# Patient Record
Sex: MALE | Race: BLACK OR AFRICAN AMERICAN | NOT HISPANIC OR LATINO
[De-identification: names, ages, dates, MRNs, and addresses within clinical notes are randomized per-mention and may not be internally consistent; named-entity substitution may affect disease eponyms.]

---

## 2017-06-08 ENCOUNTER — NON-APPOINTMENT (OUTPATIENT)
Age: 57
End: 2017-06-08

## 2017-06-08 ENCOUNTER — LABORATORY RESULT (OUTPATIENT)
Age: 57
End: 2017-06-08

## 2017-06-08 ENCOUNTER — APPOINTMENT (OUTPATIENT)
Dept: INTERNAL MEDICINE | Facility: CLINIC | Age: 57
End: 2017-06-08

## 2017-06-08 VITALS
WEIGHT: 250 LBS | HEIGHT: 71 IN | SYSTOLIC BLOOD PRESSURE: 145 MMHG | BODY MASS INDEX: 35 KG/M2 | DIASTOLIC BLOOD PRESSURE: 110 MMHG | HEART RATE: 78 BPM | OXYGEN SATURATION: 98 %

## 2017-06-08 VITALS — SYSTOLIC BLOOD PRESSURE: 140 MMHG | DIASTOLIC BLOOD PRESSURE: 108 MMHG

## 2017-06-08 DIAGNOSIS — Z82.49 FAMILY HISTORY OF ISCHEMIC HEART DISEASE AND OTHER DISEASES OF THE CIRCULATORY SYSTEM: ICD-10-CM

## 2017-06-09 LAB
25(OH)D3 SERPL-MCNC: 19.2 NG/ML
ALBUMIN SERPL ELPH-MCNC: 4.6 G/DL
ALP BLD-CCNC: 92 U/L
ALT SERPL-CCNC: 24 U/L
ANION GAP SERPL CALC-SCNC: 20 MMOL/L
AST SERPL-CCNC: 21 U/L
BASOPHILS # BLD AUTO: 0.04 K/UL
BASOPHILS NFR BLD AUTO: 0.7 %
BILIRUB SERPL-MCNC: 0.4 MG/DL
BUN SERPL-MCNC: 11 MG/DL
C TRACH RRNA SPEC QL NAA+PROBE: NORMAL
CALCIUM SERPL-MCNC: 9.9 MG/DL
CHLORIDE SERPL-SCNC: 101 MMOL/L
CHOLEST SERPL-MCNC: 237 MG/DL
CHOLEST/HDLC SERPL: 4.6 RATIO
CO2 SERPL-SCNC: 20 MMOL/L
CREAT SERPL-MCNC: 1.26 MG/DL
EOSINOPHIL # BLD AUTO: 0.08 K/UL
EOSINOPHIL NFR BLD AUTO: 1.4 %
GLUCOSE SERPL-MCNC: 82 MG/DL
HBA1C MFR BLD HPLC: 6.3 %
HCT VFR BLD CALC: 42.4 %
HDLC SERPL-MCNC: 51 MG/DL
HGB BLD-MCNC: 14.5 G/DL
HIV1+2 AB SPEC QL IA.RAPID: NONREACTIVE
IMM GRANULOCYTES NFR BLD AUTO: 0.2 %
LDLC SERPL CALC-MCNC: 153 MG/DL
LYMPHOCYTES # BLD AUTO: 3 K/UL
LYMPHOCYTES NFR BLD AUTO: 53.4 %
MAN DIFF?: NORMAL
MCHC RBC-ENTMCNC: 30.7 PG
MCHC RBC-ENTMCNC: 34.2 GM/DL
MCV RBC AUTO: 89.6 FL
MONOCYTES # BLD AUTO: 0.66 K/UL
MONOCYTES NFR BLD AUTO: 11.7 %
N GONORRHOEA RRNA SPEC QL NAA+PROBE: NORMAL
NEUTROPHILS # BLD AUTO: 1.83 K/UL
NEUTROPHILS NFR BLD AUTO: 32.6 %
PLATELET # BLD AUTO: 225 K/UL
POTASSIUM SERPL-SCNC: 4.2 MMOL/L
PROT SERPL-MCNC: 7.6 G/DL
PSA SERPL-MCNC: 1.4 NG/ML
RBC # BLD: 4.73 M/UL
RBC # FLD: 13.6 %
SODIUM SERPL-SCNC: 141 MMOL/L
SOURCE AMPLIFICATION: NORMAL
T PALLIDUM AB SER QL IA: POSITIVE
TESTOST SERPL-MCNC: 165.7 NG/DL
TRIGL SERPL-MCNC: 167 MG/DL
TSH SERPL-ACNC: 2.66 UIU/ML
WBC # FLD AUTO: 5.62 K/UL

## 2017-06-15 ENCOUNTER — APPOINTMENT (OUTPATIENT)
Dept: INTERNAL MEDICINE | Facility: CLINIC | Age: 57
End: 2017-06-15

## 2017-06-15 VITALS
OXYGEN SATURATION: 98 % | HEIGHT: 71 IN | BODY MASS INDEX: 34.72 KG/M2 | WEIGHT: 248 LBS | DIASTOLIC BLOOD PRESSURE: 80 MMHG | SYSTOLIC BLOOD PRESSURE: 130 MMHG | HEART RATE: 77 BPM

## 2017-06-15 VITALS — SYSTOLIC BLOOD PRESSURE: 138 MMHG | DIASTOLIC BLOOD PRESSURE: 90 MMHG

## 2017-06-15 RX ADMIN — PENICILLIN G BENZATHINE 2 UNIT/2ML: 1200000 INJECTION, SUSPENSION INTRAMUSCULAR at 00:00

## 2017-06-22 ENCOUNTER — APPOINTMENT (OUTPATIENT)
Dept: INTERNAL MEDICINE | Facility: CLINIC | Age: 57
End: 2017-06-22

## 2017-06-26 ENCOUNTER — APPOINTMENT (OUTPATIENT)
Dept: INTERNAL MEDICINE | Facility: CLINIC | Age: 57
End: 2017-06-26

## 2017-08-29 ENCOUNTER — RX RENEWAL (OUTPATIENT)
Age: 57
End: 2017-08-29

## 2017-08-30 ENCOUNTER — RX RENEWAL (OUTPATIENT)
Age: 57
End: 2017-08-30

## 2017-08-31 ENCOUNTER — RX RENEWAL (OUTPATIENT)
Age: 57
End: 2017-08-31

## 2017-10-02 ENCOUNTER — APPOINTMENT (OUTPATIENT)
Dept: INTERNAL MEDICINE | Facility: CLINIC | Age: 57
End: 2017-10-02
Payer: COMMERCIAL

## 2017-10-02 VITALS — DIASTOLIC BLOOD PRESSURE: 96 MMHG | SYSTOLIC BLOOD PRESSURE: 124 MMHG

## 2017-10-02 PROCEDURE — 99214 OFFICE O/P EST MOD 30 MIN: CPT

## 2017-10-30 ENCOUNTER — RX RENEWAL (OUTPATIENT)
Age: 57
End: 2017-10-30

## 2017-11-13 ENCOUNTER — APPOINTMENT (OUTPATIENT)
Dept: INTERNAL MEDICINE | Facility: CLINIC | Age: 57
End: 2017-11-13

## 2018-04-23 ENCOUNTER — RX RENEWAL (OUTPATIENT)
Age: 58
End: 2018-04-23

## 2018-05-01 ENCOUNTER — RX RENEWAL (OUTPATIENT)
Age: 58
End: 2018-05-01

## 2018-05-08 ENCOUNTER — RX RENEWAL (OUTPATIENT)
Age: 58
End: 2018-05-08

## 2018-05-24 ENCOUNTER — APPOINTMENT (OUTPATIENT)
Dept: INTERNAL MEDICINE | Facility: CLINIC | Age: 58
End: 2018-05-24
Payer: COMMERCIAL

## 2018-05-24 VITALS
OXYGEN SATURATION: 98 % | BODY MASS INDEX: 35.28 KG/M2 | WEIGHT: 252 LBS | HEART RATE: 72 BPM | SYSTOLIC BLOOD PRESSURE: 120 MMHG | DIASTOLIC BLOOD PRESSURE: 80 MMHG | HEIGHT: 71 IN

## 2018-05-24 PROCEDURE — 99214 OFFICE O/P EST MOD 30 MIN: CPT

## 2018-05-24 RX ORDER — AMOXICILLIN 875 MG/1
875 TABLET, FILM COATED ORAL
Qty: 14 | Refills: 0 | Status: DISCONTINUED | COMMUNITY
Start: 2017-12-04

## 2018-05-24 RX ORDER — IBUPROFEN 600 MG/1
600 TABLET, FILM COATED ORAL
Qty: 28 | Refills: 0 | Status: DISCONTINUED | COMMUNITY
Start: 2017-12-04

## 2018-05-24 RX ORDER — SITAGLIPTIN 50 MG/1
50 TABLET, FILM COATED ORAL DAILY
Qty: 18 | Refills: 3 | Status: DISCONTINUED | COMMUNITY
Start: 2017-10-02 | End: 2018-05-24

## 2018-05-24 NOTE — ASSESSMENT
[FreeTextEntry1] : Patient is a 58-year-old male with history of overweight, hypertension, prediabetes, low testosterone/rectal dysfunction, history of positive RPR and presents for followup of multiple issues\par \par #1 erectile dysfunction/low testosterone\par Recheck morning testosterone\par He meanwhile start Viagra 100 mg half tablet as needed\par \par #2 hypertension\par Well-controlled on amlodipine 5 and hydrochlorothiazide 12.5\par \par #3 prediabetes\par DC'd Januvia\par Will check hemoglobin A1c counseled on diet\par \par #4 vitamin D deficiency\par Suggested half tablet gives constipation.\par \par #5 health maintenance\par CPE in 6 weeks\par Colonoscopy referral given

## 2018-05-24 NOTE — PHYSICAL EXAM
[No Acute Distress] : no acute distress [Well Nourished] : well nourished [Well Developed] : well developed [Well-Appearing] : well-appearing [Normal Voice/Communication] : normal voice/communication [No JVD] : no jugular venous distention [Supple] : supple [No Lymphadenopathy] : no lymphadenopathy [Thyroid Normal, No Nodules] : the thyroid was normal and there were no nodules present [No Respiratory Distress] : no respiratory distress  [Clear to Auscultation] : lungs were clear to auscultation bilaterally [No Accessory Muscle Use] : no accessory muscle use [Normal Rate] : normal rate  [Regular Rhythm] : with a regular rhythm [Normal S1, S2] : normal S1 and S2 [No Murmur] : no murmur heard [No Carotid Bruits] : no carotid bruits [No Edema] : there was no peripheral edema [Soft] : abdomen soft [Non Tender] : non-tender [Non-distended] : non-distended [Normal Bowel Sounds] : normal bowel sounds [No CVA Tenderness] : no CVA  tenderness [No Spinal Tenderness] : no spinal tenderness

## 2018-05-24 NOTE — HISTORY OF PRESENT ILLNESS
[FreeTextEntry1] : Followup for multiple issues [de-identified] : Patient is a 58-year-old male with history of obesity, hypertension, low testosterone, history of positive RPR, diabetes and hypercholesterolemia who presents for followup patient complains of persistent erectile dysfunction . Patient taking medication and denies polyuria, polydipsia, chest pain, palpitation, only snores on occasion but still fatigued works a night shift. Patient main complaint of erectile dysfunction, libido intact

## 2018-05-24 NOTE — HEALTH RISK ASSESSMENT
[No falls in past year] : Patient reported no falls in the past year [0] : 2) Feeling down, depressed, or hopeless: Not at all (0) [] : No [de-identified] : social [[id number]] : 0

## 2018-06-05 ENCOUNTER — RX RENEWAL (OUTPATIENT)
Age: 58
End: 2018-06-05

## 2018-07-23 ENCOUNTER — APPOINTMENT (OUTPATIENT)
Dept: INTERNAL MEDICINE | Facility: CLINIC | Age: 58
End: 2018-07-23
Payer: COMMERCIAL

## 2018-07-23 ENCOUNTER — NON-APPOINTMENT (OUTPATIENT)
Age: 58
End: 2018-07-23

## 2018-07-23 VITALS — BODY MASS INDEX: 34.87 KG/M2 | WEIGHT: 250 LBS

## 2018-07-23 VITALS
SYSTOLIC BLOOD PRESSURE: 120 MMHG | OXYGEN SATURATION: 98 % | TEMPERATURE: 98.3 F | DIASTOLIC BLOOD PRESSURE: 84 MMHG | HEIGHT: 71 IN | HEART RATE: 92 BPM

## 2018-07-23 VITALS — DIASTOLIC BLOOD PRESSURE: 86 MMHG | SYSTOLIC BLOOD PRESSURE: 120 MMHG

## 2018-07-23 DIAGNOSIS — R73.03 PREDIABETES.: ICD-10-CM

## 2018-07-23 DIAGNOSIS — E55.9 VITAMIN D DEFICIENCY, UNSPECIFIED: ICD-10-CM

## 2018-07-23 PROCEDURE — 93000 ELECTROCARDIOGRAM COMPLETE: CPT

## 2018-07-23 PROCEDURE — 99396 PREV VISIT EST AGE 40-64: CPT | Mod: 25

## 2018-07-23 PROCEDURE — 36415 COLL VENOUS BLD VENIPUNCTURE: CPT

## 2018-07-23 NOTE — ASSESSMENT
[FreeTextEntry1] : Patient is a 58-year-old male with history of overweight, hypertension, hyperlipidemia, prediabetes, fatigue, erectile dysfunction and vitamin D deficiency who presents for comprehensive annual physical examination.\par \par \par One hypertension\par Continue amlodipine 5 mg and hydrochlorothiazide 25\par Check potassium\par \par #2 hyperlipidemia\par Given strong family history prediabetes borderline number start atorvastatin 10 mg p.o. q. day\par \par #3 overweight\par Counseling diet and exercise\par \par #4 prediabetes\par Counseling diet check hemoglobin A1c\par \par #5 low testosterone\par May be secondary to obesity rule out sleep apnea\par Check for snoring\par \par #6 history of syphilis\par Check RPR\par \par #7 vitamin D insufficiency\par Counseling vitamin D use 400 mg per cystoscopy and stone\par \par #8 constipation\par Start MiraLax\par \par \par Health maintenance\par Colonoscopy pending for July 31\par Check routine labs\par Follow up in 3 months\par Referral to smoke and is\par \par #9 smoking\par Counseled on cessation referral to smoke roger

## 2018-07-23 NOTE — HISTORY OF PRESENT ILLNESS
[FreeTextEntry1] : Comprehensive annual physical examination [de-identified] : \par Patient is a 58-year-old man with history of overweight, hypertension, fatigue, prediabetes, smoking who presents for a comprehensive annual physical examination. Patient does smoke roughly a pack a day denies chest pain, shortness of breath, headache, dizziness, lightheadedness\par

## 2018-07-23 NOTE — HEALTH RISK ASSESSMENT
[Excellent] : ~his/her~  mood as  excellent [No falls in past year] : Patient reported no falls in the past year [0] : 2) Feeling down, depressed, or hopeless: Not at all (0) [None] : None [Alone] : lives alone [Employed] : employed [Single] : single [Significant Other] : lives with significant other [Sexually Active] : sexually active [High Risk Behavior] : high risk behavior [Feels Safe at Home] : Feels safe at home [Fully functional (bathing, dressing, toileting, transferring, walking, feeding)] : Fully functional (bathing, dressing, toileting, transferring, walking, feeding) [Fully functional (using the telephone, shopping, preparing meals, housekeeping, doing laundry, using] : Fully functional and needs no help or supervision to perform IADLs (using the telephone, shopping, preparing meals, housekeeping, doing laundry, using transportation, managing medications and managing finances) [Smoke Detector] : smoke detector [Carbon Monoxide Detector] : carbon monoxide detector [Safety elements used in home] : safety elements used in home [Seat Belt] :  uses seat belt [Sunscreen] : uses sunscreen [] : No [de-identified] : 3/4 ppd [de-identified] : social [Change in mental status noted] : No change in mental status noted [Language] : denies difficulty with language [Behavior] : denies difficulty with behavior [Learning/Retaining New Information] : denies difficulty learning/retaining new information [Handling Complex Tasks] : denies difficulty handling complex tasks [Reasoning] : denies difficulty with reasoning [Spatial Ability and Orientation] : denies difficulty with spatial ability and orientation [Reports changes in hearing] : Reports no changes in hearing [Reports changes in vision] : Reports no changes in vision [Reports changes in dental health] : Reports no changes in dental health [Guns at Home] : no guns at home [Travel to Developing Areas] : does not  travel to developing areas [TB Exposure] : is not being exposed to tuberculosis [Caregiver Concerns] : does not have caregiver concerns [ColonoscopyDate] : pending

## 2018-07-23 NOTE — REVIEW OF SYSTEMS
[Constipation] : constipation [Negative] : Heme/Lymph [Abdominal Pain] : no abdominal pain [Nausea] : no nausea [Diarrhea] : no diarrhea [Vomiting] : no vomiting [Heartburn] : no heartburn [Melena] : no melena

## 2018-07-23 NOTE — PHYSICAL EXAM
[No Acute Distress] : no acute distress [Well Nourished] : well nourished [Well Developed] : well developed [Well-Appearing] : well-appearing [Normal Voice/Communication] : normal voice/communication [Normal Sclera/Conjunctiva] : normal sclera/conjunctiva [Normal Outer Ear/Nose] : the outer ears and nose were normal in appearance [Normal Oropharynx] : the oropharynx was normal [Normal Nasal Mucosa] : the nasal mucosa was normal [No JVD] : no jugular venous distention [Supple] : supple [No Lymphadenopathy] : no lymphadenopathy [Thyroid Normal, No Nodules] : the thyroid was normal and there were no nodules present [No Respiratory Distress] : no respiratory distress  [Clear to Auscultation] : lungs were clear to auscultation bilaterally [Normal Percussion] : the chest was normal to percussion [Normal Rate] : normal rate  [Regular Rhythm] : with a regular rhythm [Normal S1, S2] : normal S1 and S2 [No Murmur] : no murmur heard [No Carotid Bruits] : no carotid bruits [No Abdominal Bruit] : a ~M bruit was not heard ~T in the abdomen [No Varicosities] : no varicosities [Pedal Pulses Present] : the pedal pulses are present [No Edema] : there was no peripheral edema [No Extremity Clubbing/Cyanosis] : no extremity clubbing/cyanosis [No Palpable Aorta] : no palpable aorta [Soft] : abdomen soft [Non Tender] : non-tender [Non-distended] : non-distended [No Masses] : no abdominal mass palpated [No HSM] : no HSM [Normal Bowel Sounds] : normal bowel sounds [No Hernias] : no hernias [No Stool to Guaiac] : no stool to guaiac [Normal Sphincter Tone] : normal sphincter tone [No Mass] : no mass [Penis Abnormality] : normal uncircumcised penis [Scrotum] : the scrotum was normal [Testes Mass (___cm)] : there were no testicular masses [Prostate Enlargement] : the prostate was not enlarged [Prostate Tenderness] : the prostate was not tender [No Prostate Nodules] : no prostate nodules [No CVA Tenderness] : no CVA  tenderness [No Spinal Tenderness] : no spinal tenderness [No Joint Swelling] : no joint swelling [Grossly Normal Strength/Tone] : grossly normal strength/tone [No Rash] : no rash [No Skin Lesions] : no skin lesions [Normal Gait] : normal gait [Coordination Grossly Intact] : coordination grossly intact [No Focal Deficits] : no focal deficits [Deep Tendon Reflexes (DTR)] : deep tendon reflexes were 2+ and symmetric [Speech Grossly Normal] : speech grossly normal [Memory Grossly Normal] : memory grossly normal [Normal Affect] : the affect was normal [Alert and Oriented x3] : oriented to person, place, and time [Normal Mood] : the mood was normal [Normal Insight/Judgement] : insight and judgment were intact [Stool Occult Blood] : no occult stool [de-identified] : bilateral cerumen

## 2018-07-24 ENCOUNTER — RX RENEWAL (OUTPATIENT)
Age: 58
End: 2018-07-24

## 2018-07-31 ENCOUNTER — APPOINTMENT (OUTPATIENT)
Dept: GASTROENTEROLOGY | Facility: CLINIC | Age: 58
End: 2018-07-31
Payer: COMMERCIAL

## 2018-07-31 VITALS
OXYGEN SATURATION: 98 % | DIASTOLIC BLOOD PRESSURE: 62 MMHG | BODY MASS INDEX: 35 KG/M2 | HEIGHT: 71 IN | TEMPERATURE: 98.3 F | HEART RATE: 86 BPM | WEIGHT: 250 LBS | SYSTOLIC BLOOD PRESSURE: 138 MMHG | RESPIRATION RATE: 16 BRPM

## 2018-07-31 PROCEDURE — 99204 OFFICE O/P NEW MOD 45 MIN: CPT

## 2018-08-08 ENCOUNTER — RX RENEWAL (OUTPATIENT)
Age: 58
End: 2018-08-08

## 2018-08-08 ENCOUNTER — EMERGENCY (EMERGENCY)
Facility: HOSPITAL | Age: 58
LOS: 1 days | Discharge: HOME | End: 2018-08-08
Attending: EMERGENCY MEDICINE
Payer: SELF-PAY

## 2018-08-08 ENCOUNTER — APPOINTMENT (OUTPATIENT)
Dept: INTERNAL MEDICINE | Facility: CLINIC | Age: 58
End: 2018-08-08
Payer: COMMERCIAL

## 2018-08-08 VITALS
HEIGHT: 71 IN | BODY MASS INDEX: 34.86 KG/M2 | HEART RATE: 103 BPM | TEMPERATURE: 98.3 F | DIASTOLIC BLOOD PRESSURE: 80 MMHG | WEIGHT: 249 LBS | OXYGEN SATURATION: 98 % | SYSTOLIC BLOOD PRESSURE: 112 MMHG

## 2018-08-08 VITALS
TEMPERATURE: 98 F | HEART RATE: 99 BPM | OXYGEN SATURATION: 97 % | SYSTOLIC BLOOD PRESSURE: 117 MMHG | RESPIRATION RATE: 18 BRPM | DIASTOLIC BLOOD PRESSURE: 77 MMHG

## 2018-08-08 PROCEDURE — 99283 EMERGENCY DEPT VISIT LOW MDM: CPT

## 2018-08-08 PROCEDURE — 99213 OFFICE O/P EST LOW 20 MIN: CPT

## 2018-08-08 PROCEDURE — 99283 EMERGENCY DEPT VISIT LOW MDM: CPT | Mod: 25

## 2018-08-08 PROCEDURE — 71046 X-RAY EXAM CHEST 2 VIEWS: CPT | Mod: 26

## 2018-08-08 PROCEDURE — 71046 X-RAY EXAM CHEST 2 VIEWS: CPT

## 2018-08-08 RX ORDER — ACETAMINOPHEN 500 MG
650 TABLET ORAL ONCE
Qty: 0 | Refills: 0 | Status: COMPLETED | OUTPATIENT
Start: 2018-08-08 | End: 2018-08-08

## 2018-08-08 RX ADMIN — Medication 650 MILLIGRAM(S): at 17:58

## 2018-08-08 RX ADMIN — Medication 650 MILLIGRAM(S): at 19:07

## 2018-08-08 NOTE — PHYSICAL EXAM
[No Respiratory Distress] : no respiratory distress  [Clear to Auscultation] : lungs were clear to auscultation bilaterally [No Accessory Muscle Use] : no accessory muscle use [Normal Percussion] : the chest was normal to percussion [Normal Rate] : normal rate  [Regular Rhythm] : with a regular rhythm [Normal S1, S2] : normal S1 and S2 [de-identified] : L knee pain      L Neer ++  + Empty Can

## 2018-08-08 NOTE — HISTORY OF PRESENT ILLNESS
[FreeTextEntry8] : 57 yo \par \par Jumped into a truck\par SH \par \par Tried to step up to the truck  step w r foot lower step, then L foot 2nd step, R foot slipped off lower step, fall down hitting 3rd step w ribs\par knocked wind out of him. Pain when cough\par \par L shoulder L knee and L rib pain

## 2018-08-08 NOTE — ED ADULT TRIAGE NOTE - CHIEF COMPLAINT QUOTE
L rib pain; pt missed the step to get into his truck hitting his L side on the landing of the truck ([employer] truck); also c/o L knee pain; pt ambulatory without difficulty in triage

## 2018-08-08 NOTE — ED PROVIDER NOTE - PHYSICAL EXAMINATION
Gen: AAOx3, non-toxic  Head: NCAT  HEENT: EOMI, oral mucosa moist, normal conjunctiva  Lung: CTAB, no respiratory distress, no wheezes/rhonchi/rales B/L, speaking in full sentences  CV: RRR, no murmurs, rubs or gallops  Abd: soft, NTND, no guarding  MSK: +tenderness to palpation over the left anterior rib slightly above the level of the xiphoid & mid-clavicular.  no visible deformities. no bony tenderness over shoulders or knees. full ROM of shoulders and knees.   Neuro: No focal sensory or motor deficits  Skin: Warm, well perfused, no rash, no laceration or ecchymoses.   Psych: normal affect.   ~Augie Perez PGY1

## 2018-08-08 NOTE — ED ADULT NURSE NOTE - NSIMPLEMENTINTERV_GEN_ALL_ED
Implemented All Universal Safety Interventions:  [place] to call system. Call bell, personal items and telephone within reach. Instruct patient to call for assistance. Room bathroom lighting operational. Non-slip footwear when patient is off stretcher. Physically safe environment: no spills, clutter or unnecessary equipment. Stretcher in lowest position, wheels locked, appropriate side rails in place.

## 2018-08-08 NOTE — ED ADULT NURSE NOTE - OBJECTIVE STATEMENT
59 y/o M, PMH HTN on meds, presents ambulatory to ED c/o L rib pain and L knee pain s/p missing the step to get into his truck at 0830, pt hit his L side on the landing of the [employer] truck. Pt did not fall onto ground, did hit head, no LOC, no blood thinner use. Pt reports 0/10 L rib pain at rest and 5/10 with getting up, deep inspiration. Pt also c/o L knee pain and L shoulder pain, worse with ROM. Pt is ambulatory without difficulty. No numbness/tingling, sensory deficits. No redness/swelling/ecchymosis/abrasion/laceration/bleeding noted. Pt took an over the counter pain med at 1000 but isn't sure what it was. Safety maintained.

## 2018-08-08 NOTE — ED PROVIDER NOTE - OBJECTIVE STATEMENT
57 y/o patient with PMHx of HTN presenting to the ED after a fall this morning around 8:30. No head trauma, no LOC, not on blood thinners. Has dull non-radiating pain over the left side of his rib cage made worse on coughing and taking a deep breath. Also has left knee pain and left shoulder pain.   Denies any other associated symptoms including headache, changes in vision, nausea, vomiting, SOB, numbness, tingling, weakness, or paralysis. 57 y/o patient with PMHx of HTN presenting to the ED after a fall this morning around 8:30 AM. The patient was  No head trauma, no LOC, not on blood thinners. Has dull non-radiating pain over the left side of his rib cage made worse on coughing and taking a deep breath. Also has left knee pain and left shoulder pain.   Denies any other associated symptoms including headache, changes in vision, nausea, vomiting, SOB, numbness, tingling, weakness, or paralysis. 57 y/o patient with PMHx of HTN presenting to the ED after a fall this morning around 8:30 AM. The patient was climbing into the cab of his [employer] when he slipped and felling into the cab, hitting the left side of his rib cage on the chair. He then fell backwards onto his left knee and left shoulder. No head trauma, no LOC, not on blood thinners. He continued to work for the rest of the day but decided to come to the ED when he continued to experience a dull non-radiating pain over the left side of his rib cage. The pain is made worse on coughing and taking a deep breath. Also reports minor left knee pain and left shoulder pain.    Denies any other associated symptoms including headache, changes in vision, nausea, vomiting, SOB, numbness, tingling, weakness, or paralysis. 57 y/o patient with PMHx of HTN presenting to the ED after a fall this morning around 8:30 AM. The patient was climbing into his [employer] when he slipped and fell into the cab, hitting the left side of his rib cage on the chair. He then fell backwards onto his left knee and left shoulder. No head trauma, no LOC, not on blood thinners. He continued to work for the rest of the day but decided to come to the ED when he continued to experience a dull non-radiating pain over the left side of his rib cage. The pain is made worse on coughing and taking a deep breath. Also reports minor left knee pain and left shoulder pain.    Denies any other associated symptoms including headache, changes in vision, nausea, vomiting, SOB, numbness, tingling, weakness, or paralysis.

## 2018-08-08 NOTE — ED PROVIDER NOTE - MEDICAL DECISION MAKING DETAILS
59 y/o patient with PMHx of HTN presenting to the ED with left anterior rib pain after a fall. Localized tenderness + increased pain on coughing/deep breath raises suspicion for rib fracture. Due to mechanism and pain pattern must also r/o pneumothorax. Shoulder and knee pain minor and highly unlikely to be associated with fracture--no bony tenderness and full ROM of both joints without significant pain. Will obtain CXR and reassess. 59 y/o patient with PMHx of HTN presenting to the ED with left anterior rib pain after a fall. Localized tenderness + increased pain on coughing/deep breath raises suspicion for insolated rib fracture. Due to mechanism and pain pattern must also r/o pneumothorax. Shoulder and knee pain minor and highly unlikely to be associated with fracture--no bony tenderness and full ROM of both joints without significant pain. Will obtain CXR and reassess.  --BMM

## 2018-08-08 NOTE — ED ADULT NURSE NOTE - CHPI ED NUR SYMPTOMS NEG
no fever/no bleeding/no vomiting/no numbness/no tingling/no deformity/no abrasion/no weakness/no confusion/no loss of consciousness

## 2018-08-10 LAB
25(OH)D3 SERPL-MCNC: 22.6 NG/ML
ALBUMIN SERPL ELPH-MCNC: 4.5 G/DL
ALP BLD-CCNC: 92 U/L
ALT SERPL-CCNC: 23 U/L
ANION GAP SERPL CALC-SCNC: 13 MMOL/L
AST SERPL-CCNC: 21 U/L
BASOPHILS # BLD AUTO: 0.02 K/UL
BASOPHILS NFR BLD AUTO: 0.3 %
BILIRUB SERPL-MCNC: 0.3 MG/DL
BUN SERPL-MCNC: 15 MG/DL
CALCIUM SERPL-MCNC: 9.7 MG/DL
CHLORIDE SERPL-SCNC: 102 MMOL/L
CHOLEST SERPL-MCNC: 238 MG/DL
CHOLEST/HDLC SERPL: 5.1 RATIO
CO2 SERPL-SCNC: 26 MMOL/L
CREAT SERPL-MCNC: 1.12 MG/DL
EOSINOPHIL # BLD AUTO: 0.11 K/UL
EOSINOPHIL NFR BLD AUTO: 1.5 %
GLUCOSE SERPL-MCNC: 96 MG/DL
HCT VFR BLD CALC: 41.8 %
HDLC SERPL-MCNC: 47 MG/DL
HGB BLD-MCNC: 14.1 G/DL
IMM GRANULOCYTES NFR BLD AUTO: 0.3 %
LDLC SERPL CALC-MCNC: 142 MG/DL
LYMPHOCYTES # BLD AUTO: 2.92 K/UL
LYMPHOCYTES NFR BLD AUTO: 38.9 %
MAN DIFF?: NORMAL
MCHC RBC-ENTMCNC: 30.3 PG
MCHC RBC-ENTMCNC: 33.7 GM/DL
MCV RBC AUTO: 89.7 FL
MONOCYTES # BLD AUTO: 0.83 K/UL
MONOCYTES NFR BLD AUTO: 11.1 %
NEUTROPHILS # BLD AUTO: 3.61 K/UL
NEUTROPHILS NFR BLD AUTO: 47.9 %
PLATELET # BLD AUTO: 246 K/UL
POTASSIUM SERPL-SCNC: 4 MMOL/L
PROT SERPL-MCNC: 7.6 G/DL
PSA SERPL-MCNC: 1.24 NG/ML
RBC # BLD: 4.66 M/UL
RBC # FLD: 13.7 %
RPR SER-TITR: ABNORMAL
SODIUM SERPL-SCNC: 141 MMOL/L
TESTOST SERPL-MCNC: 193.3 NG/DL
TRIGL SERPL-MCNC: 243 MG/DL
WBC # FLD AUTO: 7.51 K/UL

## 2018-09-04 ENCOUNTER — RX RENEWAL (OUTPATIENT)
Age: 58
End: 2018-09-04

## 2018-10-03 ENCOUNTER — APPOINTMENT (OUTPATIENT)
Dept: GASTROENTEROLOGY | Facility: AMBULATORY MEDICAL SERVICES | Age: 58
End: 2018-10-03
Payer: COMMERCIAL

## 2018-10-03 PROCEDURE — 45385 COLONOSCOPY W/LESION REMOVAL: CPT

## 2018-10-29 ENCOUNTER — APPOINTMENT (OUTPATIENT)
Dept: INTERNAL MEDICINE | Facility: CLINIC | Age: 58
End: 2018-10-29

## 2019-01-14 ENCOUNTER — RX RENEWAL (OUTPATIENT)
Age: 59
End: 2019-01-14

## 2019-02-11 ENCOUNTER — RX RENEWAL (OUTPATIENT)
Age: 59
End: 2019-02-11

## 2019-02-28 ENCOUNTER — RX RENEWAL (OUTPATIENT)
Age: 59
End: 2019-02-28

## 2019-05-03 ENCOUNTER — RX RENEWAL (OUTPATIENT)
Age: 59
End: 2019-05-03

## 2019-08-08 PROBLEM — I10 ESSENTIAL (PRIMARY) HYPERTENSION: Chronic | Status: ACTIVE | Noted: 2018-08-08

## 2019-08-13 ENCOUNTER — APPOINTMENT (OUTPATIENT)
Dept: INTERNAL MEDICINE | Facility: CLINIC | Age: 59
End: 2019-08-13
Payer: COMMERCIAL

## 2019-08-13 VITALS
DIASTOLIC BLOOD PRESSURE: 80 MMHG | OXYGEN SATURATION: 95 % | SYSTOLIC BLOOD PRESSURE: 141 MMHG | WEIGHT: 264 LBS | BODY MASS INDEX: 36.96 KG/M2 | TEMPERATURE: 98.6 F | HEART RATE: 78 BPM | HEIGHT: 71 IN

## 2019-08-13 DIAGNOSIS — R79.89 OTHER SPECIFIED ABNORMAL FINDINGS OF BLOOD CHEMISTRY: ICD-10-CM

## 2019-08-13 DIAGNOSIS — F17.200 NICOTINE DEPENDENCE, UNSPECIFIED, UNCOMPLICATED: ICD-10-CM

## 2019-08-13 DIAGNOSIS — E66.3 OVERWEIGHT: ICD-10-CM

## 2019-08-13 DIAGNOSIS — T14.90XA INJURY, UNSPECIFIED, INITIAL ENCOUNTER: ICD-10-CM

## 2019-08-13 DIAGNOSIS — I49.1 ATRIAL PREMATURE DEPOLARIZATION: ICD-10-CM

## 2019-08-13 PROCEDURE — G0442 ANNUAL ALCOHOL SCREEN 15 MIN: CPT

## 2019-08-13 PROCEDURE — G0444 DEPRESSION SCREEN ANNUAL: CPT

## 2019-08-13 PROCEDURE — 99214 OFFICE O/P EST MOD 30 MIN: CPT

## 2019-08-13 PROCEDURE — 99407 BEHAV CHNG SMOKING > 10 MIN: CPT | Mod: 25

## 2019-08-13 PROCEDURE — G0447 BEHAVIOR COUNSEL OBESITY 15M: CPT

## 2019-08-13 RX ORDER — POLYETHYLENE GLYCOL 3350, SODIUM SULFATE ANHYDROUS, SODIUM BICARBONATE, SODIUM CHLORIDE, POTASSIUM CHLORIDE 227.1; 21.5; 6.36; 5.53; .754 G/L; G/L; G/L; G/L; G/L
227.1 POWDER, FOR SOLUTION ORAL
Qty: 1 | Refills: 0 | Status: DISCONTINUED | COMMUNITY
Start: 2018-07-31 | End: 2019-08-13

## 2019-08-13 RX ORDER — SILDENAFIL 100 MG/1
100 TABLET, FILM COATED ORAL
Qty: 10 | Refills: 6 | Status: DISCONTINUED | COMMUNITY
Start: 2018-05-24 | End: 2019-08-13

## 2019-08-13 RX ORDER — MELOXICAM 15 MG/1
15 TABLET ORAL
Qty: 30 | Refills: 3 | Status: DISCONTINUED | COMMUNITY
Start: 2018-08-08 | End: 2019-08-13

## 2019-08-14 PROBLEM — I49.1 PAC (PREMATURE ATRIAL CONTRACTION): Status: RESOLVED | Noted: 2017-06-08 | Resolved: 2019-08-14

## 2019-08-14 PROBLEM — R79.89 LOW TESTOSTERONE IN MALE: Status: RESOLVED | Noted: 2018-07-23 | Resolved: 2019-08-14

## 2019-08-14 PROBLEM — T14.90XA TRAUMA, BLUNT: Status: RESOLVED | Noted: 2018-08-08 | Resolved: 2019-08-14

## 2019-08-14 PROBLEM — E66.3 OVERWEIGHT: Noted: 2017-06-08

## 2019-08-14 LAB
ALBUMIN SERPL ELPH-MCNC: 4.6 G/DL
ALP BLD-CCNC: 91 U/L
ALT SERPL-CCNC: 25 U/L
ANION GAP SERPL CALC-SCNC: 15 MMOL/L
AST SERPL-CCNC: 20 U/L
BASOPHILS # BLD AUTO: 0.05 K/UL
BASOPHILS NFR BLD AUTO: 0.8 %
BILIRUB SERPL-MCNC: 0.2 MG/DL
BUN SERPL-MCNC: 11 MG/DL
CALCIUM SERPL-MCNC: 9.9 MG/DL
CHLORIDE SERPL-SCNC: 107 MMOL/L
CHOLEST SERPL-MCNC: 212 MG/DL
CHOLEST/HDLC SERPL: 5.4 RATIO
CO2 SERPL-SCNC: 21 MMOL/L
CREAT SERPL-MCNC: 1.21 MG/DL
EOSINOPHIL # BLD AUTO: 0.12 K/UL
EOSINOPHIL NFR BLD AUTO: 2 %
ESTIMATED AVERAGE GLUCOSE: 128 MG/DL
GLUCOSE SERPL-MCNC: 90 MG/DL
HBA1C MFR BLD HPLC: 6.1 %
HCT VFR BLD CALC: 43.4 %
HDLC SERPL-MCNC: 39 MG/DL
HGB BLD-MCNC: 14.2 G/DL
HIV1+2 AB SPEC QL IA.RAPID: NONREACTIVE
IMM GRANULOCYTES NFR BLD AUTO: 0.3 %
LDLC SERPL CALC-MCNC: 120 MG/DL
LYMPHOCYTES # BLD AUTO: 3.25 K/UL
LYMPHOCYTES NFR BLD AUTO: 53.2 %
MAN DIFF?: NORMAL
MCHC RBC-ENTMCNC: 29.9 PG
MCHC RBC-ENTMCNC: 32.7 GM/DL
MCV RBC AUTO: 91.4 FL
MONOCYTES # BLD AUTO: 0.78 K/UL
MONOCYTES NFR BLD AUTO: 12.8 %
NEUTROPHILS # BLD AUTO: 1.89 K/UL
NEUTROPHILS NFR BLD AUTO: 30.9 %
PLATELET # BLD AUTO: 202 K/UL
POTASSIUM SERPL-SCNC: 4.4 MMOL/L
PROT SERPL-MCNC: 7.2 G/DL
PSA SERPL-MCNC: 1.23 NG/ML
RBC # BLD: 4.75 M/UL
RBC # FLD: 13.5 %
SODIUM SERPL-SCNC: 143 MMOL/L
TRIGL SERPL-MCNC: 263 MG/DL
TSH SERPL-ACNC: 3.26 UIU/ML
WBC # FLD AUTO: 6.11 K/UL

## 2019-08-14 RX ORDER — HYDROCHLOROTHIAZIDE 25 MG/1
25 TABLET ORAL
Qty: 90 | Refills: 3 | Status: DISCONTINUED | COMMUNITY
Start: 2017-06-08 | End: 2019-08-14

## 2019-08-14 RX ORDER — AMLODIPINE BESYLATE 5 MG/1
5 TABLET ORAL DAILY
Qty: 90 | Refills: 2 | Status: DISCONTINUED | COMMUNITY
Start: 2017-06-08 | End: 2019-08-14

## 2019-08-14 NOTE — PHYSICAL EXAM
[No Acute Distress] : no acute distress [Well Nourished] : well nourished [Well Developed] : well developed [Well-Appearing] : well-appearing [Normal Sclera/Conjunctiva] : normal sclera/conjunctiva [PERRL] : pupils equal round and reactive to light [EOMI] : extraocular movements intact [Normal Outer Ear/Nose] : the outer ears and nose were normal in appearance [Normal Oropharynx] : the oropharynx was normal [No JVD] : no jugular venous distention [No Lymphadenopathy] : no lymphadenopathy [Supple] : supple [No Respiratory Distress] : no respiratory distress  [Thyroid Normal, No Nodules] : the thyroid was normal and there were no nodules present [No Accessory Muscle Use] : no accessory muscle use [Clear to Auscultation] : lungs were clear to auscultation bilaterally [Normal Rate] : normal rate  [Regular Rhythm] : with a regular rhythm [No Murmur] : no murmur heard [Normal S1, S2] : normal S1 and S2 [No Carotid Bruits] : no carotid bruits [No Abdominal Bruit] : a ~M bruit was not heard ~T in the abdomen [No Varicosities] : no varicosities [Pedal Pulses Present] : the pedal pulses are present [No Edema] : there was no peripheral edema [No Palpable Aorta] : no palpable aorta [No Extremity Clubbing/Cyanosis] : no extremity clubbing/cyanosis [Soft] : abdomen soft [Non-distended] : non-distended [Non Tender] : non-tender [No Masses] : no abdominal mass palpated [No HSM] : no HSM [Normal Bowel Sounds] : normal bowel sounds [Normal Posterior Cervical Nodes] : no posterior cervical lymphadenopathy [Normal Anterior Cervical Nodes] : no anterior cervical lymphadenopathy [No CVA Tenderness] : no CVA  tenderness [No Spinal Tenderness] : no spinal tenderness [No Joint Swelling] : no joint swelling [Grossly Normal Strength/Tone] : grossly normal strength/tone [No Rash] : no rash [Coordination Grossly Intact] : coordination grossly intact [No Focal Deficits] : no focal deficits [Normal Gait] : normal gait [Deep Tendon Reflexes (DTR)] : deep tendon reflexes were 2+ and symmetric [Normal Affect] : the affect was normal [Normal Insight/Judgement] : insight and judgment were intact

## 2019-08-14 NOTE — ASSESSMENT
[FreeTextEntry1] : \par Medical Issue 1: Chronic hypertension: unstable and variably controlled with BP readings fluctuating from 120s-140s/70s-90s; check renal panel and electrolytes via blood draw today; pt says he no longer wants to take his medication and wants to try to control his diet and start exercising again before restarting the medications\par \par Medical Issue 2: Hyperlipidemia: unstable and variably controlled; he ran out of his statin medication 1 month ago; check lipid panel today via blood draw and encouraged diet, exercise and weight loss\par \par Medical Issue 3: Obesity counselling: 15 mins, assessed BMI at 30 and above now in obese range; advised weight loss, exercise routine and diet; pt agreed to start exercise program for target weight loss 20 lbs; assisted patient with resources including nutrition referral as needed and arranged for follow-up to monitor weight loss progress over next several months\par \par Tobacco counselling: spent >10 mins with patient assessed current tobacco usage of 1/2 PPD which increased from 1/4 PPD; advised decrease tobacco intake and quit with nicotine replacement therapy; pt agreed to start Nicorette gum or Nicoderm patches; assisted patient with resources including tobacco cessation programs as needed and arranged for follow-up to monitor progress\par \par Depression screen performed today, PHQ2=0\par \par Annual alcohol misuse screen, 15 mins, done; negative

## 2019-08-14 NOTE — HEALTH RISK ASSESSMENT
[] : Yes [11-15] : 11-15 [Yes] : Yes [Monthly or less (1 pt)] : Monthly or less (1 point) [1 or 2 (0 pts)] : 1 or 2 (0 points) [No falls in past year] : Patient reported no falls in the past year [Audit-CScore] : 0 [de-identified] : poor

## 2021-08-01 ENCOUNTER — EMERGENCY (EMERGENCY)
Facility: HOSPITAL | Age: 61
LOS: 1 days | Discharge: HOME | End: 2021-08-01
Admitting: STUDENT IN AN ORGANIZED HEALTH CARE EDUCATION/TRAINING PROGRAM
Payer: COMMERCIAL

## 2021-08-01 VITALS
HEART RATE: 94 BPM | OXYGEN SATURATION: 100 % | TEMPERATURE: 98 F | RESPIRATION RATE: 18 BRPM | DIASTOLIC BLOOD PRESSURE: 96 MMHG | SYSTOLIC BLOOD PRESSURE: 153 MMHG

## 2021-08-01 LAB
B PERT DNA SPEC QL NAA+PROBE: SIGNIFICANT CHANGE UP
C PNEUM DNA SPEC QL NAA+PROBE: SIGNIFICANT CHANGE UP
FLUAV H1 2009 PAND RNA SPEC QL NAA+PROBE: SIGNIFICANT CHANGE UP
FLUAV H1 RNA SPEC QL NAA+PROBE: SIGNIFICANT CHANGE UP
FLUAV H3 RNA SPEC QL NAA+PROBE: SIGNIFICANT CHANGE UP
FLUAV SUBTYP SPEC NAA+PROBE: SIGNIFICANT CHANGE UP
FLUBV RNA SPEC QL NAA+PROBE: SIGNIFICANT CHANGE UP
HADV DNA SPEC QL NAA+PROBE: SIGNIFICANT CHANGE UP
HCOV 229E RNA SPEC QL NAA+PROBE: SIGNIFICANT CHANGE UP
HCOV HKU1 RNA SPEC QL NAA+PROBE: SIGNIFICANT CHANGE UP
HCOV NL63 RNA SPEC QL NAA+PROBE: SIGNIFICANT CHANGE UP
HCOV OC43 RNA SPEC QL NAA+PROBE: SIGNIFICANT CHANGE UP
HMPV RNA SPEC QL NAA+PROBE: SIGNIFICANT CHANGE UP
HPIV1 RNA SPEC QL NAA+PROBE: SIGNIFICANT CHANGE UP
HPIV2 RNA SPEC QL NAA+PROBE: SIGNIFICANT CHANGE UP
HPIV3 RNA SPEC QL NAA+PROBE: SIGNIFICANT CHANGE UP
HPIV4 RNA SPEC QL NAA+PROBE: SIGNIFICANT CHANGE UP
RAPID RVP RESULT: DETECTED
RSV RNA SPEC QL NAA+PROBE: SIGNIFICANT CHANGE UP
RV+EV RNA SPEC QL NAA+PROBE: SIGNIFICANT CHANGE UP
SARS-COV-2 RNA SPEC QL NAA+PROBE: DETECTED

## 2021-08-01 PROCEDURE — 99284 EMERGENCY DEPT VISIT MOD MDM: CPT

## 2021-08-01 NOTE — ED PROVIDER NOTE - NS ED ROS FT
Constitutional: (+) Fatigue, (-) Fever, (-) Chills, (-) Anorexia  Skin: (-) Rashes  Ears: (-) Ear pain  Nose: (-) Nasal congestion, (-) Runny nose  Mouth/Throat: (-) Sore throat  CV: (-) Chest pain, (-) Palpitations  Resp: (-) Cough, (-) Shortness of breath, (-) Dyspnea on Exertion, (-) Wheezing  GI: (-) Abdominal pain, (-) Nausea, (-) Vomiting, (-) Diarrhea  : (-) Dysuria, (-) Hematuria, (-) Increased frequency  MSK: (-) Weakness, (-) Myalgias, (-) Calf pain  Neuro: (-) Headache

## 2021-08-01 NOTE — ED ADULT NURSE NOTE - OBJECTIVE STATEMENT
pt presents with c/o loss of smell and taste, states returned from [facility] last night, and sx developed while still in [place], states not aware of any exposure to covid positive people, no other symptoms reported, breathing easy

## 2021-08-01 NOTE — ED PROVIDER NOTE - CLINICAL SUMMARY MEDICAL DECISION MAKING FREE TEXT BOX
61y Male with no sig PHMx presents to the ER for lost sense of taste and smell x 3 days. Denies runny nose, cough, chest pain, shortness of breath, abdominal pain, nausea, vomiting or diarrhea. Vital signs stable, concern for viral illness, likely covid - will swab and dc.

## 2021-08-01 NOTE — ED PROVIDER NOTE - OBJECTIVE STATEMENT
61y Male with no sig PHMx presents to the ER for lost sense of taste and smell. Patient reports feeling fatigue, body aches, subjective fever last week followed by lost sense of taste and smell 3 days ago after going to [place]. Patient states he got home from [place] last night, states his girlfriend woke up without her sense of taste and smell so presents to the ER for evaluation. Denies runny nose, cough, chest pain, shortness of breath, abdominal pain, nausea, vomiting or diarrhea.

## 2021-08-01 NOTE — ED PROVIDER NOTE - NSFOLLOWUPINSTRUCTIONS_ED_ALL_ED_FT
Take Tylenol 650mg (Two 325 mg pills) every 4-6 hours as needed for pain or fevers.    Take Motrin 600 mg every 8 hours as needed for moderate pain or fevers -- take with food.    Advance activity as tolerated.     Continue all previously prescribed medications as directed unless otherwise instructed.     Follow up with your primary care physician in 48-72 hours- bring copies of your results.     Return to the ER for worsening or persistent symptoms, and/or ANY NEW OR CONCERNING SYMPTOMS including but not limited to chest pain, shortness of breath or difficulty breathing.    If you have issues obtaining follow up, please call: [phone] to obtain a doctor or specialist who takes your insurance in your area.  You may call [phone] to make an appointment with the internal medicine clinic.

## 2021-08-01 NOTE — ED PROVIDER NOTE - PATIENT PORTAL LINK FT
You can access the FollowMyHealth Patient Portal offered by [facility] by registering at the following website: [email or web address]. By joining [facility]’s FollowMyHealth portal, you will also be able to view your health information using other applications (apps) compatible with our system.

## 2021-08-03 NOTE — ED POST DISCHARGE NOTE - REASON FOR FOLLOW-UP
COVID-19 : detected. Patient contacted already aware of positive results received email notification. Emailed to patient positive results for his work. Emailed to [email or web address] Other

## 2021-10-27 ENCOUNTER — EMERGENCY (EMERGENCY)
Facility: HOSPITAL | Age: 61
LOS: 1 days | Discharge: HOME | End: 2021-10-27
Admitting: EMERGENCY MEDICINE
Payer: COMMERCIAL

## 2021-10-27 VITALS
SYSTOLIC BLOOD PRESSURE: 156 MMHG | DIASTOLIC BLOOD PRESSURE: 91 MMHG | TEMPERATURE: 99 F | OXYGEN SATURATION: 98 % | HEART RATE: 77 BPM | RESPIRATION RATE: 16 BRPM

## 2021-10-27 VITALS
HEART RATE: 82 BPM | SYSTOLIC BLOOD PRESSURE: 144 MMHG | DIASTOLIC BLOOD PRESSURE: 74 MMHG | TEMPERATURE: 98 F | RESPIRATION RATE: 16 BRPM | OXYGEN SATURATION: 99 %

## 2021-10-27 PROCEDURE — 73030 X-RAY EXAM OF SHOULDER: CPT | Mod: 26,LT

## 2021-10-27 PROCEDURE — 99053 MED SERV 10PM-8AM 24 HR FAC: CPT

## 2021-10-27 PROCEDURE — 99284 EMERGENCY DEPT VISIT MOD MDM: CPT

## 2021-10-27 PROCEDURE — 72100 X-RAY EXAM L-S SPINE 2/3 VWS: CPT | Mod: 26

## 2021-10-27 RX ORDER — IBUPROFEN 200 MG
1 TABLET ORAL
Qty: 25 | Refills: 0
Start: 2021-10-27

## 2021-10-27 RX ORDER — IBUPROFEN 200 MG
600 TABLET ORAL ONCE
Refills: 0 | Status: COMPLETED | OUTPATIENT
Start: 2021-10-27 | End: 2021-10-27

## 2021-10-27 RX ORDER — CYCLOBENZAPRINE HYDROCHLORIDE 10 MG/1
1 TABLET, FILM COATED ORAL
Qty: 20 | Refills: 0
Start: 2021-10-27

## 2021-10-27 RX ORDER — LIDOCAINE 4 G/100G
1 CREAM TOPICAL ONCE
Refills: 0 | Status: COMPLETED | OUTPATIENT
Start: 2021-10-27 | End: 2021-10-27

## 2021-10-27 RX ORDER — LIDOCAINE 4 G/100G
1 CREAM TOPICAL
Qty: 6 | Refills: 0
Start: 2021-10-27

## 2021-10-27 RX ORDER — CYCLOBENZAPRINE HYDROCHLORIDE 10 MG/1
10 TABLET, FILM COATED ORAL ONCE
Refills: 0 | Status: COMPLETED | OUTPATIENT
Start: 2021-10-27 | End: 2021-10-27

## 2021-10-27 RX ADMIN — CYCLOBENZAPRINE HYDROCHLORIDE 10 MILLIGRAM(S): 10 TABLET, FILM COATED ORAL at 05:06

## 2021-10-27 RX ADMIN — Medication 600 MILLIGRAM(S): at 05:01

## 2021-10-27 RX ADMIN — LIDOCAINE 1 PATCH: 4 CREAM TOPICAL at 05:06

## 2021-10-27 RX ADMIN — Medication 600 MILLIGRAM(S): at 06:02

## 2021-10-27 NOTE — ED PROVIDER NOTE - MUSCULOSKELETAL, MLM
tenderness to the left shoulder region. flexion/extension. no bony deformities. back: tenderness to the left lumbosacral region. no midline spinal tenderness. neck: no midline spinal tenderness. Spine appears normal, range of motion is not limited, no muscle or joint tenderness
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
## 2021-10-27 NOTE — ED PROVIDER NOTE - PATIENT PORTAL LINK FT
You can access the FollowMyHealth Patient Portal offered by [facility] by registering at the following website: [email or web address]. By joining [facility]’s FollowMyHealth portal, you will also be able to view your health information using other applications (apps) compatible with our system.
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
## 2022-03-07 NOTE — ED PROVIDER NOTE - NS ED ROS FT
GENERAL: No fever or chills  EYES: no change in vision  HEENT: no trouble swallowing or speaking  CARDIAC: no chest pain  PULMONARY: no cough or SOB  GI: no abdominal pain, no nausea, no vomiting, no diarrhea or constipation  : No changes in urination  SKIN: no rashes, lacerations, or bruising   NEURO: no headache, numbness, tingling, or weakness   MSK: +pain in left rib cage, left shoulder, and left knee as described in HPI      ~Augie Perez PGY1 GENERAL: No fever or chills  EYES: no change in vision  HEENT: no trouble swallowing or speaking  CARDIAC: no chest pain  PULMONARY: no cough or SOB  GI: no abdominal pain, no nausea, no vomiting, no diarrhea or constipation  : No changes in urination  SKIN: no rashes, lacerations, or bruising   NEURO: no headache, numbness, tingling, or weakness   MSK: +pain in left rib cage as described in HPI. +minor left shoulder and left knee pain.     ~Augie Perez PGY1 no

## 2022-10-31 ENCOUNTER — LABORATORY RESULT (OUTPATIENT)
Age: 62
End: 2022-10-31

## 2022-10-31 ENCOUNTER — APPOINTMENT (OUTPATIENT)
Dept: INTERNAL MEDICINE | Facility: CLINIC | Age: 62
End: 2022-10-31

## 2022-10-31 VITALS
TEMPERATURE: 97.6 F | BODY MASS INDEX: 36.96 KG/M2 | OXYGEN SATURATION: 98 % | HEIGHT: 71 IN | HEART RATE: 95 BPM | SYSTOLIC BLOOD PRESSURE: 132 MMHG | DIASTOLIC BLOOD PRESSURE: 85 MMHG | WEIGHT: 264 LBS

## 2022-10-31 DIAGNOSIS — Z00.00 ENCOUNTER FOR GENERAL ADULT MEDICAL EXAMINATION W/OUT ABNORMAL FINDINGS: ICD-10-CM

## 2022-10-31 DIAGNOSIS — Z87.898 PERSONAL HISTORY OF OTHER SPECIFIED CONDITIONS: ICD-10-CM

## 2022-10-31 DIAGNOSIS — Z12.11 ENCOUNTER FOR SCREENING FOR MALIGNANT NEOPLASM OF COLON: ICD-10-CM

## 2022-10-31 DIAGNOSIS — Z13.31 ENCOUNTER FOR SCREENING FOR DEPRESSION: ICD-10-CM

## 2022-10-31 DIAGNOSIS — M25.512 PAIN IN LEFT SHOULDER: ICD-10-CM

## 2022-10-31 DIAGNOSIS — Z13.39 ENCOUNTER FOR SCREENING EXAM FOR OTHER MENTAL HEALTH AND BEHAVIORAL DISORDERS: ICD-10-CM

## 2022-10-31 DIAGNOSIS — Z87.891 PERSONAL HISTORY OF NICOTINE DEPENDENCE: ICD-10-CM

## 2022-10-31 DIAGNOSIS — R20.0 ANESTHESIA OF SKIN: ICD-10-CM

## 2022-10-31 DIAGNOSIS — R20.8 OTHER DISTURBANCES OF SKIN SENSATION: ICD-10-CM

## 2022-10-31 DIAGNOSIS — Z87.438 PERSONAL HISTORY OF OTHER DISEASES OF MALE GENITAL ORGANS: ICD-10-CM

## 2022-10-31 PROCEDURE — 99386 PREV VISIT NEW AGE 40-64: CPT

## 2022-10-31 RX ORDER — ATORVASTATIN CALCIUM 10 MG/1
10 TABLET, FILM COATED ORAL
Qty: 90 | Refills: 3 | Status: DISCONTINUED | COMMUNITY
Start: 2018-07-23 | End: 2022-10-31

## 2022-10-31 NOTE — ASSESSMENT
[Z87.891   Personal history of nicotine dependence] : budgerigar-fanciers' disease [FreeTextEntry1] : HCM:\par - Lipids, DM, CMP, CBC\par - Flu, PCV20 discussed, both declined\par \par RTC 1-2 months for multiple medical complaints\par

## 2022-10-31 NOTE — PHYSICAL EXAM
[No Acute Distress] : no acute distress [Well Nourished] : well nourished [Well Developed] : well developed [Well-Appearing] : well-appearing [Normal Sclera/Conjunctiva] : normal sclera/conjunctiva [PERRL] : pupils equal round and reactive to light [EOMI] : extraocular movements intact [Normal Outer Ear/Nose] : the outer ears and nose were normal in appearance [Normal Oropharynx] : the oropharynx was normal [Supple] : supple [No Respiratory Distress] : no respiratory distress  [No Accessory Muscle Use] : no accessory muscle use [Clear to Auscultation] : lungs were clear to auscultation bilaterally [Normal Rate] : normal rate  [Regular Rhythm] : with a regular rhythm [Normal S1, S2] : normal S1 and S2 [No Murmur] : no murmur heard [No Edema] : there was no peripheral edema [Soft] : abdomen soft [Non Tender] : non-tender [Non-distended] : non-distended [No Masses] : no abdominal mass palpated [No Spinal Tenderness] : no spinal tenderness [Grossly Normal Strength/Tone] : grossly normal strength/tone [No Rash] : no rash [Coordination Grossly Intact] : coordination grossly intact [Normal Gait] : normal gait [Normal Affect] : the affect was normal [Normal Insight/Judgement] : insight and judgment were intact [Kyphosis] : no kyphosis [FreeTextEntry1] : hemorrhoid noted at 6 oclock [de-identified] : Pain elicted with empty can and active abduction. No pain with flexion, lift-off.  [de-identified] : Decreased sensation to light touch over dorsum of left foot BUT normal sensation over anterior shin. 5/5 strength in the lower extremities throughout.

## 2022-10-31 NOTE — REVIEW OF SYSTEMS
[Shortness Of Breath] : shortness of breath [Dizziness] : dizziness [Chest Pain] : no chest pain [Palpitations] : no palpitations [Lower Ext Edema] : no lower extremity edema

## 2022-10-31 NOTE — HEALTH RISK ASSESSMENT
[Current] : Current [20 or more] : 20 or more [Yes] : Yes [Monthly or less (1 pt)] : Monthly or less (1 point) [7 to 9 (3 pts)] : 7 to 9 (3  points) [Monthly (2 pts)] : Monthly (2 points) [No] : In the past 12 months have you used drugs other than those required for medical reasons? No [0] : 2) Feeling down, depressed, or hopeless: Not at all (0) [PHQ-2 Negative - No further assessment needed] : PHQ-2 Negative - No further assessment needed [With Family] : lives with family [Significant Other] : lives with significant other [Sexually Active] : sexually active [Reports changes in vision] : Reports changes in vision [Reports changes in dental health] : Reports changes in dental health [Audit-CScore] : 6 [de-identified] : Very bad [[id number]] : 0 [Reports changes in hearing] : Reports no changes in hearing

## 2022-11-02 DIAGNOSIS — H61.20 IMPACTED CERUMEN, UNSPECIFIED EAR: ICD-10-CM

## 2022-11-02 DIAGNOSIS — K59.00 CONSTIPATION, UNSPECIFIED: ICD-10-CM

## 2022-11-02 LAB
ALBUMIN SERPL ELPH-MCNC: 4.8 G/DL
ALP BLD-CCNC: 108 U/L
ALT SERPL-CCNC: 26 U/L
ANION GAP SERPL CALC-SCNC: 16 MMOL/L
AST SERPL-CCNC: 15 U/L
BASOPHILS # BLD AUTO: 0.06 K/UL
BASOPHILS NFR BLD AUTO: 1 %
BILIRUB SERPL-MCNC: 0.2 MG/DL
BUN SERPL-MCNC: 13 MG/DL
C TRACH RRNA SPEC QL NAA+PROBE: NOT DETECTED
CALCIUM SERPL-MCNC: 9.9 MG/DL
CHLORIDE SERPL-SCNC: 105 MMOL/L
CHOLEST SERPL-MCNC: 249 MG/DL
CO2 SERPL-SCNC: 21 MMOL/L
CREAT SERPL-MCNC: 1.23 MG/DL
EGFR: 67 ML/MIN/1.73M2
EOSINOPHIL # BLD AUTO: 0.13 K/UL
EOSINOPHIL NFR BLD AUTO: 2.2 %
ESTIMATED AVERAGE GLUCOSE: 137 MG/DL
GLUCOSE SERPL-MCNC: 97 MG/DL
HBA1C MFR BLD HPLC: 6.4 %
HCT VFR BLD CALC: 44.8 %
HCV AB SER QL: NONREACTIVE
HCV S/CO RATIO: 0.12 S/CO
HDLC SERPL-MCNC: 48 MG/DL
HGB BLD-MCNC: 15 G/DL
HIV1+2 AB SPEC QL IA.RAPID: NONREACTIVE
IMM GRANULOCYTES NFR BLD AUTO: 0.5 %
LDLC SERPL CALC-MCNC: 153 MG/DL
LYMPHOCYTES # BLD AUTO: 2.83 K/UL
LYMPHOCYTES NFR BLD AUTO: 47 %
MAN DIFF?: NORMAL
MCHC RBC-ENTMCNC: 30.2 PG
MCHC RBC-ENTMCNC: 33.5 GM/DL
MCV RBC AUTO: 90.1 FL
MONOCYTES # BLD AUTO: 0.84 K/UL
MONOCYTES NFR BLD AUTO: 14 %
N GONORRHOEA RRNA SPEC QL NAA+PROBE: NOT DETECTED
NEUTROPHILS # BLD AUTO: 2.13 K/UL
NEUTROPHILS NFR BLD AUTO: 35.3 %
NONHDLC SERPL-MCNC: 201 MG/DL
PLATELET # BLD AUTO: 211 K/UL
POTASSIUM SERPL-SCNC: 4.4 MMOL/L
PROT SERPL-MCNC: 7.3 G/DL
PSA SERPL-MCNC: 2.1 NG/ML
RBC # BLD: 4.97 M/UL
RBC # FLD: 13.6 %
SODIUM SERPL-SCNC: 143 MMOL/L
SOURCE AMPLIFICATION: NORMAL
T PALLIDUM AB SER QL IA: POSITIVE
TRIGL SERPL-MCNC: 240 MG/DL
TSH SERPL-ACNC: 1.95 UIU/ML
VIT B12 SERPL-MCNC: 453 PG/ML
WBC # FLD AUTO: 6.02 K/UL

## 2022-11-18 ENCOUNTER — NON-APPOINTMENT (OUTPATIENT)
Age: 62
End: 2022-11-18

## 2022-11-18 VITALS — HEIGHT: 72 IN | WEIGHT: 260 LBS | BODY MASS INDEX: 35.21 KG/M2

## 2022-11-18 DIAGNOSIS — Z78.9 OTHER SPECIFIED HEALTH STATUS: ICD-10-CM

## 2022-11-18 NOTE — ASSESSMENT
[Discussed Risks and Advised to Quit Smoking] : Discussed risks and advised to quit smoking [Discussed Cessation Strategies] : cessation strategies were discussed [Ready] : Patient is ready for cessation intervention

## 2022-11-18 NOTE — PLAN
[Outpatient Counseling Referral] : Outpatient counseling referral made for patient [[facility]] : referred to [facility] [phone] [FreeTextEntry1] : Plan:\par \par -Low Dose CT chest for lung cancer screening scheduled for 11/21 at [facility]\par \par -Patient to follow up with Dr. Raymundo Maldonado to review results of LDCT\par \par -Encouraged smoking cessation\par \par -Referral to CTC\par \par Engaged in shared decision making with Mr. KRISTA MULLEN . Answered all questions. He verbalized understanding and agreement. He knows to call back with any questions or concerns\par

## 2022-11-18 NOTE — REASON FOR VISIT
[Home] : at home, [unfilled] , at the time of the visit. [Medical Office: ([facility])___] : at the medical office located in  [Verbal consent obtained from patient] : the patient, [unfilled] [Initial Evaluation] : an initial evaluation visit [Review of Eligibility] : review of eligibility [Low-Dose CT Screening Discussion] : low-dose CT lung cancer screening discussion [Virtual Visit] : virtual visit

## 2022-11-21 ENCOUNTER — OUTPATIENT (OUTPATIENT)
Dept: OUTPATIENT SERVICES | Facility: HOSPITAL | Age: 62
LOS: 1 days | End: 2022-11-21
Payer: COMMERCIAL

## 2022-11-21 ENCOUNTER — APPOINTMENT (OUTPATIENT)
Dept: CT IMAGING | Facility: IMAGING CENTER | Age: 62
End: 2022-11-21

## 2022-11-21 DIAGNOSIS — Z00.00 ENCOUNTER FOR GENERAL ADULT MEDICAL EXAMINATION WITHOUT ABNORMAL FINDINGS: ICD-10-CM

## 2022-11-21 PROCEDURE — 71271 CT THORAX LUNG CANCER SCR C-: CPT

## 2022-11-21 PROCEDURE — 71271 CT THORAX LUNG CANCER SCR C-: CPT | Mod: 26

## 2022-11-29 DIAGNOSIS — K76.9 LIVER DISEASE, UNSPECIFIED: ICD-10-CM

## 2023-02-28 ENCOUNTER — APPOINTMENT (OUTPATIENT)
Dept: INTERNAL MEDICINE | Facility: CLINIC | Age: 63
End: 2023-02-28

## 2023-03-26 NOTE — ED ADULT NURSE NOTE - CCCP TRG CHIEF CMPLNT
rib pain/injury Pt lives at group home PTA, alert, confused, non-verbal, Limited intake/wt change history data available at present; poor dentition, h/o dysphagia, tolerating diet with good appetite, 100% intake reported, 76 to 100% intake at time per flowsheet, needs assistance to cut food into small pieces, will try Soft/Bite Sized food; Unknown food allergies per Chart  Pt lives at group home PTA, alert, confused, non-verbal, Limited intake/wt change history data available at present; poor dentition, h/o dysphagia, tolerating diet with good appetite, 100% intake reported, 76 to 100% intake at time per flowsheet, needs assistance to cut food into small pieces, will try Soft/Bite Sized food; Allergy to seafood

## 2023-05-08 ENCOUNTER — NON-APPOINTMENT (OUTPATIENT)
Age: 63
End: 2023-05-08

## 2023-05-08 ENCOUNTER — APPOINTMENT (OUTPATIENT)
Dept: INTERNAL MEDICINE | Facility: CLINIC | Age: 63
End: 2023-05-08
Payer: COMMERCIAL

## 2023-05-08 ENCOUNTER — RESULT REVIEW (OUTPATIENT)
Age: 63
End: 2023-05-08

## 2023-05-08 VITALS — SYSTOLIC BLOOD PRESSURE: 160 MMHG | DIASTOLIC BLOOD PRESSURE: 90 MMHG

## 2023-05-08 VITALS
WEIGHT: 265 LBS | BODY MASS INDEX: 35.89 KG/M2 | OXYGEN SATURATION: 95 % | DIASTOLIC BLOOD PRESSURE: 95 MMHG | SYSTOLIC BLOOD PRESSURE: 176 MMHG | HEART RATE: 94 BPM | HEIGHT: 72 IN

## 2023-05-08 DIAGNOSIS — I10 ESSENTIAL (PRIMARY) HYPERTENSION: ICD-10-CM

## 2023-05-08 DIAGNOSIS — N52.9 MALE ERECTILE DYSFUNCTION, UNSPECIFIED: ICD-10-CM

## 2023-05-08 DIAGNOSIS — R06.09 OTHER FORMS OF DYSPNEA: ICD-10-CM

## 2023-05-08 DIAGNOSIS — R20.0 ANESTHESIA OF SKIN: ICD-10-CM

## 2023-05-08 DIAGNOSIS — R40.0 SOMNOLENCE: ICD-10-CM

## 2023-05-08 PROCEDURE — 93000 ELECTROCARDIOGRAM COMPLETE: CPT

## 2023-05-08 PROCEDURE — 99214 OFFICE O/P EST MOD 30 MIN: CPT | Mod: 25

## 2023-05-08 RX ORDER — ATORVASTATIN CALCIUM 10 MG/1
10 TABLET, FILM COATED ORAL
Qty: 90 | Refills: 1 | Status: DISCONTINUED | COMMUNITY
Start: 2022-11-02 | End: 2023-05-08

## 2023-05-08 NOTE — PHYSICAL EXAM
[No Acute Distress] : no acute distress [Well Developed] : well developed [Normal Sclera/Conjunctiva] : normal sclera/conjunctiva [No Respiratory Distress] : no respiratory distress  [No Accessory Muscle Use] : no accessory muscle use [Clear to Auscultation] : lungs were clear to auscultation bilaterally [Normal Rate] : normal rate  [Regular Rhythm] : with a regular rhythm [Normal S1, S2] : normal S1 and S2 [No Murmur] : no murmur heard [Normal Affect] : the affect was normal [Normal Insight/Judgement] : insight and judgment were intact [de-identified] : +1 radial pulse of right, +2 radial pulse of left. ?trace edema of the right hand compared to left [de-identified] : RUE 5/5 throughout, symmetric to left. Sensation grossly equal and intact in the upper extrmeities.

## 2023-05-08 NOTE — HISTORY OF PRESENT ILLNESS
[de-identified] : Martin Howard is a 62yo M with PMhx of HLD, preDM, active smoking who presents to follow-up.\par \par Numbness in right arm: \par 2-3 weeks. 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 Thinks he may have worsened it with prolonged plank position while engaging in sexual activity. Cannot reliably trigger the pain with any other movement\par \par Erectile dysfunction:\par Decreased drive and libido. Noting decreased morning erectile. Does report some increasing daytime fatigue. Reports he has tried sildenafil 50mg with some improvement.

## 2023-06-01 ENCOUNTER — OUTPATIENT (OUTPATIENT)
Dept: OUTPATIENT SERVICES | Facility: HOSPITAL | Age: 63
LOS: 1 days | End: 2023-06-01
Payer: COMMERCIAL

## 2023-06-01 ENCOUNTER — APPOINTMENT (OUTPATIENT)
Dept: ULTRASOUND IMAGING | Facility: CLINIC | Age: 63
End: 2023-06-01
Payer: COMMERCIAL

## 2023-06-01 DIAGNOSIS — K76.9 LIVER DISEASE, UNSPECIFIED: ICD-10-CM

## 2023-06-01 PROCEDURE — 76705 ECHO EXAM OF ABDOMEN: CPT | Mod: 26

## 2023-06-01 PROCEDURE — 76705 ECHO EXAM OF ABDOMEN: CPT

## 2023-06-06 LAB
FSH SERPL-MCNC: 7.7 IU/L
PROLACTIN SERPL-MCNC: 6.7 NG/ML
TESTOST FREE SERPL-MCNC: 2.2 PG/ML
TESTOST SERPL-MCNC: 66.9 NG/DL
TSH SERPL-ACNC: 1.9 UIU/ML

## 2023-06-20 DIAGNOSIS — E29.1 TESTICULAR HYPOFUNCTION: ICD-10-CM

## 2023-06-20 LAB
FSH SERPL-MCNC: 7.2 IU/L
TESTOST FREE SERPL-MCNC: 3.9 PG/ML
TESTOST SERPL-MCNC: 221 NG/DL

## 2023-06-26 ENCOUNTER — APPOINTMENT (OUTPATIENT)
Dept: INTERNAL MEDICINE | Facility: CLINIC | Age: 63
End: 2023-06-26

## 2023-07-03 ENCOUNTER — APPOINTMENT (OUTPATIENT)
Dept: ENDOCRINOLOGY | Facility: CLINIC | Age: 63
End: 2023-07-03

## 2023-09-25 ENCOUNTER — EMERGENCY (EMERGENCY)
Facility: HOSPITAL | Age: 63
LOS: 1 days | Discharge: HOME | End: 2023-09-25
Attending: PERSONAL EMERGENCY RESPONSE ATTENDANT | Admitting: PERSONAL EMERGENCY RESPONSE ATTENDANT
Payer: OTHER MISCELLANEOUS

## 2023-09-25 VITALS
SYSTOLIC BLOOD PRESSURE: 133 MMHG | TEMPERATURE: 98 F | HEART RATE: 97 BPM | RESPIRATION RATE: 17 BRPM | OXYGEN SATURATION: 100 % | DIASTOLIC BLOOD PRESSURE: 79 MMHG

## 2023-09-25 PROCEDURE — 99284 EMERGENCY DEPT VISIT MOD MDM: CPT

## 2023-09-25 RX ORDER — KETOROLAC TROMETHAMINE 30 MG/ML
30 SYRINGE (ML) INJECTION ONCE
Refills: 0 | Status: DISCONTINUED | OUTPATIENT
Start: 2023-09-25 | End: 2023-09-25

## 2023-09-25 RX ADMIN — Medication 30 MILLIGRAM(S): at 14:00

## 2023-09-25 NOTE — ED PROVIDER NOTE - CLINICAL SUMMARY MEDICAL DECISION MAKING FREE TEXT BOX
62 y/o male with no pmhx presents to ED c/o mechanical fall 2 days ago. Pt works as a . Pt was pushing a heavy object in the back of the truck when he fell forward with outstretched arms. Pt hit his head on his right arm. No LOC. Pt c/o acute on chronic right arm numbness and tingling. pt with no neuro deficits, no midline spinal deficits. neuropathy like exacerbated by fall. plan to provide pain control, nsaids, refer to spine outpatient for follow up. pt amenable w/ plan. 62 y/o male with no pmhx presents to ED c/o mechanical fall 2 days ago. Pt works as a . Pt was pushing a heavy object in the back of the truck when he fell forward with outstretched arms. Pt hit his head on his right arm. No LOC. Pt c/o acute on chronic right arm numbness and tingling. pt with no neuro deficits, no midline spinal deficits. neuropathy like exacerbated by fall. plan to provide pain control, nsaids, refer to spine outpatient for follow up. pt amenable w/ plan.    Attending MD Jones.  Pt seen by myself and ZOLTAN Corona in real time.  Initial documentation completed by me. Pt is a 62 yo male with pmhx of RUE tingling remotely s/p resolution remotely of unclear origin who presents to ED s/p fall at work yesterday on slippery floor of a truck flat onto R outstretched arm and front of body.  Pt denies LOC.  No neck pain/back pain.  No n/v/d.  Neuro intact.  Presents now with intermittent tingling of RUE.  No ROM limitation.  No focal TTP.  No midline spinal TTP/stepoffs on full spine exam.  Sxs reproducible with persistent neck flexion, improve with neck repositioning.  No palpable or notable deformities.  Suspect acute on chronic work-related nerve impingement exacerbated by fall.  Planned anti-inflammatories, referral for outpt f/u with spine, note for work.  Return for new/worsened sxs. 62 y/o male with no pmhx presents to ED c/o mechanical fall 2 days ago. Pt works as a . Pt was pushing a heavy object in the back of the truck when he fell forward with outstretched arms. Pt hit his head on his right arm. No LOC. Pt c/o acute on chronic right arm numbness and tingling. pt with no neuro deficits, no midline spinal deficits. neuropathy like exacerbated by fall. plan to provide pain control, nsaids, refer to spine outpatient for follow up. pt amenable w/ plan.    Attending MD Jones.  Pt seen by myself and ZOLTAN Corona in real time.  Initial documentation completed by me. Pt is a 64 yo male with pmhx of RUE tingling remotely s/p resolution remotely of unclear origin who presents to ED s/p fall at work yesterday on slippery floor of a truck flat onto R outstretched arm and front of body.  Pt denies LOC.  No neck pain/back pain.  No n/v/d.  Neuro intact.  Presents now with intermittent tingling of RUE.  No ROM limitation.  No focal TTP.  No midline spinal TTP/stepoffs on full spine exam.  Sxs reproducible with persistent neck flexion, improve with neck repositioning.  No palpable or notable deformities.  Suspect acute on chronic work-related nerve impingement exacerbated by fall.  Planned anti-inflammatories, referral for outpt f/u with spine, note for work.  Return for new/worsened sxs..

## 2023-09-25 NOTE — ED PROVIDER NOTE - PATIENT PORTAL LINK FT
You can access the FollowMyHealth Patient Portal offered by [facility] by registering at the following website: [email or web address]. By joining [facility]’s FollowMyHealth portal, you will also be able to view your health information using other applications (apps) compatible with our system.

## 2023-09-25 NOTE — ED PROVIDER NOTE - MUSCULOSKELETAL, MLM
Spine appears normal, range of motion is not limited, no muscle or joint tenderness. No midline spinal ttp. 5/5 strength b/l.

## 2023-09-25 NOTE — ED ADULT TRIAGE NOTE - CHIEF COMPLAINT QUOTE
Presents to ED states he slipped while pushing a container up his truck, metal plate leading up to truck was wet. States he fell on his R elbow and scrapped L knee. States he hit head on arm. Denies medical history.
IV discontinued, cath removed intact

## 2023-09-25 NOTE — ED ADULT NURSE NOTE - CHIEF COMPLAINT QUOTE
Presents to ED states he slipped while pushing a container up his truck, metal plate leading up to truck was wet. States he fell on his R elbow and scrapped L knee. States he hit head on arm. Denies medical history.

## 2023-09-25 NOTE — ED PROVIDER NOTE - OBJECTIVE STATEMENT
62 y/o male with no pmhx presents to ED c/o mechanical fall 2 days ago. Pt works as a . Pt was pushing a heavy object in the back of the truck when he fell forward with outstretched arms. Pt hit his head on his right arm. No LOC. Pt c/o acute on chronic right arm numbness and tingling. Denies neck or back pain. No cp, sob, abd pain.

## 2023-09-25 NOTE — ED PROVIDER NOTE - ATTENDING APP SHARED VISIT CONTRIBUTION OF CARE
Attending MD Jones:  I performed a history and physical exam of the patient and discussed their management with the PA. I reviewed the PA's note and agree with the documented findings and plan of care. My medical decision making and observations are found above.

## 2024-03-13 ENCOUNTER — EMERGENCY (EMERGENCY)
Facility: HOSPITAL | Age: 64
LOS: 1 days | Discharge: HOME | End: 2024-03-13
Attending: EMERGENCY MEDICINE | Admitting: EMERGENCY MEDICINE
Payer: COMMERCIAL

## 2024-03-13 VITALS
OXYGEN SATURATION: 99 % | HEART RATE: 75 BPM | TEMPERATURE: 98 F | SYSTOLIC BLOOD PRESSURE: 156 MMHG | RESPIRATION RATE: 18 BRPM | DIASTOLIC BLOOD PRESSURE: 92 MMHG

## 2024-03-13 VITALS
SYSTOLIC BLOOD PRESSURE: 180 MMHG | TEMPERATURE: 98 F | OXYGEN SATURATION: 98 % | DIASTOLIC BLOOD PRESSURE: 100 MMHG | HEART RATE: 84 BPM | RESPIRATION RATE: 18 BRPM

## 2024-03-13 LAB
ALBUMIN SERPL ELPH-MCNC: 4.5 G/DL — SIGNIFICANT CHANGE UP (ref 3.3–5)
ALP SERPL-CCNC: 100 U/L — SIGNIFICANT CHANGE UP (ref 40–120)
ALT FLD-CCNC: 19 U/L — SIGNIFICANT CHANGE UP (ref 4–41)
ANION GAP SERPL CALC-SCNC: 11 MMOL/L — SIGNIFICANT CHANGE UP (ref 7–14)
AST SERPL-CCNC: 18 U/L — SIGNIFICANT CHANGE UP (ref 4–40)
BASOPHILS # BLD AUTO: 0.06 K/UL — SIGNIFICANT CHANGE UP (ref 0–0.2)
BASOPHILS NFR BLD AUTO: 0.9 % — SIGNIFICANT CHANGE UP (ref 0–2)
BILIRUB SERPL-MCNC: 0.4 MG/DL — SIGNIFICANT CHANGE UP (ref 0.2–1.2)
BUN SERPL-MCNC: 12 MG/DL — SIGNIFICANT CHANGE UP (ref 7–23)
CALCIUM SERPL-MCNC: 9.8 MG/DL — SIGNIFICANT CHANGE UP (ref 8.4–10.5)
CHLORIDE SERPL-SCNC: 106 MMOL/L — SIGNIFICANT CHANGE UP (ref 98–107)
CO2 SERPL-SCNC: 26 MMOL/L — SIGNIFICANT CHANGE UP (ref 22–31)
CREAT SERPL-MCNC: 1.27 MG/DL — SIGNIFICANT CHANGE UP (ref 0.5–1.3)
EGFR: 63 ML/MIN/1.73M2 — SIGNIFICANT CHANGE UP
EOSINOPHIL # BLD AUTO: 0.11 K/UL — SIGNIFICANT CHANGE UP (ref 0–0.5)
EOSINOPHIL NFR BLD AUTO: 1.6 % — SIGNIFICANT CHANGE UP (ref 0–6)
GLUCOSE SERPL-MCNC: 112 MG/DL — HIGH (ref 70–99)
HCT VFR BLD CALC: 44.9 % — SIGNIFICANT CHANGE UP (ref 39–50)
HGB BLD-MCNC: 15.3 G/DL — SIGNIFICANT CHANGE UP (ref 13–17)
IANC: 2.6 K/UL — SIGNIFICANT CHANGE UP (ref 1.8–7.4)
IMM GRANULOCYTES NFR BLD AUTO: 0.3 % — SIGNIFICANT CHANGE UP (ref 0–0.9)
LYMPHOCYTES # BLD AUTO: 3.25 K/UL — SIGNIFICANT CHANGE UP (ref 1–3.3)
LYMPHOCYTES # BLD AUTO: 47.3 % — HIGH (ref 13–44)
MAGNESIUM SERPL-MCNC: 2.2 MG/DL — SIGNIFICANT CHANGE UP (ref 1.6–2.6)
MCHC RBC-ENTMCNC: 30.7 PG — SIGNIFICANT CHANGE UP (ref 27–34)
MCHC RBC-ENTMCNC: 34.1 GM/DL — SIGNIFICANT CHANGE UP (ref 32–36)
MCV RBC AUTO: 90 FL — SIGNIFICANT CHANGE UP (ref 80–100)
MONOCYTES # BLD AUTO: 0.83 K/UL — SIGNIFICANT CHANGE UP (ref 0–0.9)
MONOCYTES NFR BLD AUTO: 12.1 % — SIGNIFICANT CHANGE UP (ref 2–14)
NEUTROPHILS # BLD AUTO: 2.6 K/UL — SIGNIFICANT CHANGE UP (ref 1.8–7.4)
NEUTROPHILS NFR BLD AUTO: 37.8 % — LOW (ref 43–77)
NRBC # BLD: 0 /100 WBCS — SIGNIFICANT CHANGE UP (ref 0–0)
NRBC # FLD: 0 K/UL — SIGNIFICANT CHANGE UP (ref 0–0)
PLATELET # BLD AUTO: 218 K/UL — SIGNIFICANT CHANGE UP (ref 150–400)
POTASSIUM SERPL-MCNC: 4.8 MMOL/L — SIGNIFICANT CHANGE UP (ref 3.5–5.3)
POTASSIUM SERPL-SCNC: 4.8 MMOL/L — SIGNIFICANT CHANGE UP (ref 3.5–5.3)
PROT SERPL-MCNC: 7.7 G/DL — SIGNIFICANT CHANGE UP (ref 6–8.3)
RBC # BLD: 4.99 M/UL — SIGNIFICANT CHANGE UP (ref 4.2–5.8)
RBC # FLD: 13.5 % — SIGNIFICANT CHANGE UP (ref 10.3–14.5)
SODIUM SERPL-SCNC: 143 MMOL/L — SIGNIFICANT CHANGE UP (ref 135–145)
TROPONIN T, HIGH SENSITIVITY RESULT: 11 NG/L — SIGNIFICANT CHANGE UP
WBC # BLD: 6.87 K/UL — SIGNIFICANT CHANGE UP (ref 3.8–10.5)
WBC # FLD AUTO: 6.87 K/UL — SIGNIFICANT CHANGE UP (ref 3.8–10.5)

## 2024-03-13 PROCEDURE — 93010 ELECTROCARDIOGRAM REPORT: CPT

## 2024-03-13 PROCEDURE — 70498 CT ANGIOGRAPHY NECK: CPT | Mod: 26,MC

## 2024-03-13 PROCEDURE — 99285 EMERGENCY DEPT VISIT HI MDM: CPT

## 2024-03-13 PROCEDURE — 70496 CT ANGIOGRAPHY HEAD: CPT | Mod: 26,MC

## 2024-03-13 RX ORDER — ACETAMINOPHEN 500 MG
1000 TABLET ORAL ONCE
Refills: 0 | Status: COMPLETED | OUTPATIENT
Start: 2024-03-13 | End: 2024-03-13

## 2024-03-13 RX ORDER — MECLIZINE HCL 12.5 MG
25 TABLET ORAL ONCE
Refills: 0 | Status: COMPLETED | OUTPATIENT
Start: 2024-03-13 | End: 2024-03-13

## 2024-03-13 RX ORDER — SODIUM CHLORIDE 9 MG/ML
1000 INJECTION INTRAMUSCULAR; INTRAVENOUS; SUBCUTANEOUS ONCE
Refills: 0 | Status: COMPLETED | OUTPATIENT
Start: 2024-03-13 | End: 2024-03-13

## 2024-03-13 RX ADMIN — Medication 25 MILLIGRAM(S): at 21:35

## 2024-03-13 RX ADMIN — Medication 400 MILLIGRAM(S): at 21:35

## 2024-03-13 RX ADMIN — SODIUM CHLORIDE 1000 MILLILITER(S): 9 INJECTION INTRAMUSCULAR; INTRAVENOUS; SUBCUTANEOUS at 21:35

## 2024-03-13 NOTE — ED PROVIDER NOTE - ATTENDING APP SHARED VISIT CONTRIBUTION OF CARE
Patient is a 64-year-old male who denies any chronic medical problems here for evaluation of room spinning dizziness x 2-3 days.  Patient states he feels like he has to catch his balance.  He also notes that he has been having palpitations.  No chest pain.  Denies vision changes, focal weakness.  No fevers, chills, nausea, vomiting.  Patient reports that he has a right shoulder injury for which he is getting PT.  He states he does feel like he gets a headache at night when he lays down.  Patient denies any urinary symptoms.  No black or bloody stools.    VS noted  Gen. no acute distress, Non toxic   HEENT: EOMI, mmm  Lungs: CTAB/L no C/ W /R   CVS: RRR   Abd; Soft non tender, non distended   Ext: no edema  Skin: no rash  Neuro AAOx3, CN  II-XII intact, strength 5/5 UE/LE, gait normal. clear speech  a/p:  vertigo–plan for meclizine, labs to rule out metabolic derangement, CT head.  - Jennifer RIVERA

## 2024-03-13 NOTE — ED PROVIDER NOTE - PHYSICAL EXAMINATION
CONSTITUTIONAL: Well-appearing; well-nourished; in no apparent distress. Non-toxic appearing.   NEURO: Alert, oriented x 3. Gait steady without assistance. No facial droop. Tongue protrudes midline. Strength to upper and lower extremities 5/5. No pronator drift. FTN smooth and symmetric b/l.   PSYCH: Mood appropriate. Thought processes intact.   NECK: Supple  CARD: Regular rate and rhythm, no murmurs  RESP: No accessory muscle use; breath sounds clear and equal bilaterally; no wheezes, rhonchi, or rales     ABD: Soft; non-distended; non-tender. No guarding or rebound.   MUSCULOSKELETAL/EXTREMITIES: FROM in all four extremities; no extremity edema.  SKIN: Warm; dry; no apparent lesions or exudate

## 2024-03-13 NOTE — ED ADULT TRIAGE NOTE - CHIEF COMPLAINT QUOTE
Pt. 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 Also c/o right arm numbness x 1 month. BP elevated in triage. Denies chest pain or SOB.

## 2024-03-13 NOTE — ED ADULT NURSE NOTE - PAIN: PRESENCE, MLM
Follow-up with primary care next week for re-evaluation, medication management and close blood pressure monitoring. Referral for substance abuse counseling or detox encouraged as well.    Stop drinking alcohol to excess.    Return to the ED for altered mental status, seizure, vomiting or other new concerns.  
denies pain/discomfort (Rating = 0)

## 2024-03-13 NOTE — ED PROVIDER NOTE - NSFOLLOWUPINSTRUCTIONS_ED_ALL_ED_FT
Vertigo  Vertigo is the feeling that you or the things around you are moving when they are not. This feeling can come and go at any time. Vertigo often goes away on its own. This condition can be dangerous if it happens when you are doing activities like driving or working with machines.    Your doctor will do tests to find the cause of your vertigo. These tests will also help your doctor decide on the best treatment for you.    Follow these instructions at home:  Eating and drinking    A comparison of three sample cups showing dark yellow, yellow, and pale yellow urine.  A sign showing that a person should not drink beer, wine, or liquor.  Drink enough fluid to keep your pee (urine) pale yellow.  Do not drink alcohol.  Activity    Return to your normal activities when your doctor says that it is safe.  In the morning, first sit up on the side of the bed. When you feel okay, stand slowly while you hold onto something until you know that your balance is fine.  Move slowly. Avoid sudden body or head movements or certain positions, as told by your doctor.  Use a cane if you have trouble standing or walking.  Sit down right away if you feel dizzy.  Avoid doing any tasks or activities that can cause danger to you or others if you get dizzy.  Avoid bending down if you feel dizzy. Place items in your home so that they are easy for you to reach without bending or leaning over.  Do not drive or use machinery if you feel dizzy.  General instructions    Take over-the-counter and prescription medicines only as told by your doctor.  Keep all follow-up visits.  Contact a doctor if:  Your medicine does not help your vertigo.  Your problems get worse or you have new symptoms.  You have a fever.  You feel like you may vomit (nauseous), or this feeling gets worse.  You start to vomit.  Your family or friends see changes in how you act.  You lose feeling (have numbness) in part of your body.  You feel prickling and tingling in a part of your body.  Get help right away if:  You are always dizzy.  You faint.  You get very bad headaches.  You get a stiff neck.  Bright light starts to bother you.  You have trouble moving or talking.  You feel weak in your hands, arms, or legs.  You have changes in your hearing or in how you see (vision).  These symptoms may be an emergency. Get help right away. Call your local emergency services (911 in the [place]  Do not wait to see if the symptoms will go away.  Do not drive yourself to the hospital.  Summary  Vertigo is the feeling that you or the things around you are moving when they are not.  Your doctor will do tests to find the cause of your vertigo.  You may be told to avoid some tasks, positions, or movements.  Contact a doctor if your medicine is not helping, or if you have a fever, new symptoms, or a change in how you act.  Get help right away if you get very bad headaches, or if you have changes in how you speak, hear, or see.  This information is not intended to replace advice given to you by your health care provider. Make sure you discuss any questions you have with your health care provider.

## 2024-03-13 NOTE — ED PROVIDER NOTE - OBJECTIVE STATEMENT
64-year-old male with no stated past medical history not on any meds daily presents complaining of dizziness described as room spinning for the past 2 to 3 days.  Patient notes associated intermittent blurred vision, headache and intermittent palpitations.  He has to "catch his balance" when he stands. He notes palpitations are not necessarily occurring at the same time as the dizziness but has noticed them over the last few days.  Denies chest pain, shortness of breath, fever, chills, earache, tenderness, abdominal pain, nausea, vomiting.  Patient notes he does have paresthesias of his right upper extremity but this is chronic secondary to a fall on the job approximately 6 months ago for which she is in physical therapy and is deciding if he would like to undergo surgery or not.  No other voiced complaints.

## 2024-03-13 NOTE — ED PROVIDER NOTE - PATIENT PORTAL LINK FT
You can access the FollowMyHealth Patient Portal offered by [facility] by registering at the following website: [email or web address]. By joining [facility]’s FollowMyHealth portal, you will also be able to view your health information using other applications (apps) compatible with our system.

## 2024-03-13 NOTE — ED ADULT NURSE NOTE - OBJECTIVE STATEMENT
pt received in 26A. pt is AAOX4 and ambulatory. pt presents to ED C/o dizziness for 2/3 days. pt states it feels like room is spinning and he is off-balanced. pt denies PMHx. pt reports intermittent palpitations for last few days. pt denies chest pain, SOB, N/V/D, headaches, dysuria and fever/chills. pt reports numbness to right arm, pt reports fall at work 6x months ago and is in PT for shoulder. pt RR are even and unlabored. pt has steady gait. pt 20g IV in LAC, labs drawn and sent and pt medicated as ordered. Safety maintained. Pending CT scan.

## 2024-03-13 NOTE — ED PROVIDER NOTE - ATTENDING SHARED VISIT SELECTOR YES
Muscle aches and nasal congestion since yesterday. No cough. Subjective fever reported. No sore throat. The history is provided by the patient. Nasal Congestion This is a new problem. The current episode started yesterday. The problem occurs constantly. The problem has not changed since onset. Associated symptoms include shortness of breath. Pertinent negatives include no chest pain. Nothing aggravates the symptoms. Nothing relieves the symptoms. Past Medical History:  
Diagnosis Date  Neurological disorder   
 migraines Past Surgical History:  
Procedure Laterality Date  HX GYN    
 HX TUBAL LIGATION No family history on file. Social History Socioeconomic History  Marital status:  Spouse name: Not on file  Number of children: Not on file  Years of education: Not on file  Highest education level: Not on file Social Needs  Financial resource strain: Not on file  Food insecurity - worry: Not on file  Food insecurity - inability: Not on file  Transportation needs - medical: Not on file  Transportation needs - non-medical: Not on file Occupational History  Not on file Tobacco Use  Smoking status: Current Every Day Smoker Packs/day: 0.25 Substance and Sexual Activity  Alcohol use: No  
  Comment: once a month  Drug use: No  
 Sexual activity: Yes Other Topics Concern  Not on file Social History Narrative  Not on file ALLERGIES: Flexeril [cyclobenzaprine] and Phenergan [promethazine] Review of Systems Constitutional: Positive for fever. HENT: Positive for congestion and rhinorrhea. Negative for sore throat. Respiratory: Positive for shortness of breath. Negative for cough. Cardiovascular: Negative for chest pain. Gastrointestinal: Negative for vomiting. Vitals:  
 01/17/19 1906 BP: 131/83 Pulse: 96  
Resp: 18 Temp: 99.3 °F (37.4 °C) SpO2: 99% Weight: 80.7 kg (178 lb) Height: 5' 4\" (1.626 m) Physical Exam  
Constitutional: She appears well-developed and well-nourished. HENT:  
Right Ear: External ear normal.  
Left Ear: External ear normal.  
Mouth/Throat: Oropharynx is clear and moist. No oropharyngeal exudate. Oropharynx injected, uvula midline Neck: Normal range of motion. Neck supple. Cardiovascular: Normal rate, regular rhythm and normal heart sounds. Pulmonary/Chest: Effort normal and breath sounds normal. No respiratory distress. She has no wheezes. She has no rales. Musculoskeletal: Normal range of motion. She exhibits no edema or tenderness. Lymphadenopathy:  
  She has no cervical adenopathy. Nursing note and vitals reviewed. MDM Number of Diagnoses or Management Options Diagnosis management comments: Flu test negative. Viral URI present. No pneumonia clinically. Symptomatic care recommended. Amount and/or Complexity of Data Reviewed Clinical lab tests: ordered and reviewed (Results for orders placed or performed during the hospital encounter of 01/17/19 -INFLUENZA A & B AG (RAPID TEST) Result                      Value             Ref Range Influenza A Ag              NEGATIVE          NEG Influenza B Ag              NEGATIVE          NEG Source NASOPHARYNGEAL 
) Procedures Yes

## 2024-03-13 NOTE — ED PROVIDER NOTE - PROGRESS NOTE DETAILS
ZOLTAN Grigsby: Labs WNL and CTAs negative but incidental findings of prominent adenoids noted. Pt feeling well and ambulatory and states his dizziness is improved. Discussed all results and explained he will be d/c'ed home to f/u with ENT.

## 2024-03-13 NOTE — ED PROVIDER NOTE - BIRTH SEX
Assessment:  1. Pulmonary embolism  2. Stage 4 cancer            Plan:  1.          Thank you      Vascular Cardiology Service    Please call with any questions:   DIRECT SERVICE NUMBER:  [phone]  Office [phone]  email:  [email or web address] Assessment:  1. Pulmonary embolism  2. Stage 4 cancer   3. Thrombocytopenia     Plan:  1. Patient is at risk for VTE given advanced cancer. PE found incidentally. Unlikely etiology for hypotension. Elevated HS troponin likely in the setting of demand ischemia.   2. Given thrombocytopenia, anticoagulation is relatively contraindicated at this time given risk for spontaneous bleeding (CTAP read concerning for hematoma)  3. Can obtain LE venous dopplers  4. Can obtain TTE  5. Vascular cardiology to follow     Final recommendations pending attending attestation    Thank you    Vascular Cardiology Service    Please call with any questions:   DIRECT SERVICE NUMBER:  [phone]  Office [phone]  email:  [email or web address]

## 2024-03-13 NOTE — ED PROVIDER NOTE - CLINICAL SUMMARY MEDICAL DECISION MAKING FREE TEXT BOX
64-year-old male with no stated past medical history not on any meds daily presents complaining of dizziness described as room spinning for the past 2 to 3 days.  Patient notes associated intermittent blurred vision, headache and intermittent palpitations.  He has to "catch his balance" when he stands. He notes palpitations are not necessarily occurring at the same time as the dizziness but has noticed them over the last few days.  Denies chest pain, shortness of breath, fever, chills, earache, tenderness, abdominal pain, nausea, vomiting.  Patient notes he does have paresthesias of his right upper extremity but this is chronic secondary to a fall on the job approximately 6 months ago for which she is in physical therapy and is deciding if he would like to undergo surgery or not.  No other voiced complaints.  Vitals, hypertensive otherwise WNL. Exam as noted above. EKG NSR, no Eze/STd. DDX to consider but not limited to: ACS vs vertigo vs TIA/CVA. Less likely ACS or TIA/CVA given exam but will obtain labs including trop and CTA Head/Neck and provide meclizine then reassess. Follow progress notes to dispo.

## 2024-03-14 RX ORDER — MECLIZINE HCL 12.5 MG
1 TABLET ORAL
Qty: 21 | Refills: 0
Start: 2024-03-14 | End: 2024-03-20

## 2024-03-21 ENCOUNTER — APPOINTMENT (OUTPATIENT)
Dept: NEUROLOGY | Facility: CLINIC | Age: 64
End: 2024-03-21

## 2024-03-22 ENCOUNTER — OUTPATIENT (OUTPATIENT)
Dept: OUTPATIENT SERVICES | Facility: HOSPITAL | Age: 64
LOS: 1 days | End: 2024-03-22
Payer: COMMERCIAL

## 2024-03-22 ENCOUNTER — APPOINTMENT (OUTPATIENT)
Dept: INTERNAL MEDICINE | Facility: CLINIC | Age: 64
End: 2024-03-22
Payer: COMMERCIAL

## 2024-03-22 ENCOUNTER — NON-APPOINTMENT (OUTPATIENT)
Age: 64
End: 2024-03-22

## 2024-03-22 VITALS
WEIGHT: 263 LBS | HEIGHT: 72 IN | BODY MASS INDEX: 35.62 KG/M2 | OXYGEN SATURATION: 98 % | SYSTOLIC BLOOD PRESSURE: 142 MMHG | DIASTOLIC BLOOD PRESSURE: 90 MMHG | HEART RATE: 95 BPM

## 2024-03-22 VITALS — SYSTOLIC BLOOD PRESSURE: 134 MMHG | DIASTOLIC BLOOD PRESSURE: 80 MMHG

## 2024-03-22 DIAGNOSIS — H81.12 BENIGN PAROXYSMAL VERTIGO, LEFT EAR: ICD-10-CM

## 2024-03-22 DIAGNOSIS — I10 ESSENTIAL (PRIMARY) HYPERTENSION: ICD-10-CM

## 2024-03-22 DIAGNOSIS — H54.7 UNSPECIFIED VISUAL LOSS: ICD-10-CM

## 2024-03-22 PROCEDURE — G0463: CPT

## 2024-03-22 PROCEDURE — 99214 OFFICE O/P EST MOD 30 MIN: CPT

## 2024-03-22 RX ORDER — BISACODYL 10 MG/1
10 SUPPOSITORY RECTAL DAILY
Qty: 90 | Refills: 0 | Status: COMPLETED | COMMUNITY
Start: 2022-11-02 | End: 2024-03-22

## 2024-03-22 RX ORDER — POLYETHYLENE GLYCOL 3350 17 G
17 POWDER IN PACKET (EA) ORAL
Qty: 1 | Refills: 1 | Status: COMPLETED | COMMUNITY
Start: 2022-11-02 | End: 2024-03-22

## 2024-03-26 ENCOUNTER — NON-APPOINTMENT (OUTPATIENT)
Age: 64
End: 2024-03-26

## 2024-03-26 ENCOUNTER — APPOINTMENT (OUTPATIENT)
Dept: OPHTHALMOLOGY | Facility: CLINIC | Age: 64
End: 2024-03-26
Payer: COMMERCIAL

## 2024-03-26 PROCEDURE — 92083 EXTENDED VISUAL FIELD XM: CPT

## 2024-03-26 PROCEDURE — 99204 OFFICE O/P NEW MOD 45 MIN: CPT

## 2024-04-01 DIAGNOSIS — H81.12 BENIGN PAROXYSMAL VERTIGO, LEFT EAR: ICD-10-CM

## 2024-04-01 DIAGNOSIS — H54.7 UNSPECIFIED VISUAL LOSS: ICD-10-CM

## 2024-04-02 ENCOUNTER — APPOINTMENT (OUTPATIENT)
Dept: INTERNAL MEDICINE | Facility: CLINIC | Age: 64
End: 2024-04-02

## 2024-04-24 ENCOUNTER — APPOINTMENT (OUTPATIENT)
Dept: INTERNAL MEDICINE | Facility: CLINIC | Age: 64
End: 2024-04-24
Payer: COMMERCIAL

## 2024-04-24 ENCOUNTER — OUTPATIENT (OUTPATIENT)
Dept: OUTPATIENT SERVICES | Facility: HOSPITAL | Age: 64
LOS: 1 days | End: 2024-04-24
Payer: COMMERCIAL

## 2024-04-24 VITALS — SYSTOLIC BLOOD PRESSURE: 148 MMHG | DIASTOLIC BLOOD PRESSURE: 88 MMHG

## 2024-04-24 VITALS
WEIGHT: 261 LBS | OXYGEN SATURATION: 97 % | BODY MASS INDEX: 35.35 KG/M2 | SYSTOLIC BLOOD PRESSURE: 146 MMHG | HEART RATE: 82 BPM | HEIGHT: 72 IN | DIASTOLIC BLOOD PRESSURE: 88 MMHG

## 2024-04-24 DIAGNOSIS — I10 ESSENTIAL (PRIMARY) HYPERTENSION: ICD-10-CM

## 2024-04-24 DIAGNOSIS — E66.9 OBESITY, UNSPECIFIED: ICD-10-CM

## 2024-04-24 DIAGNOSIS — E78.5 HYPERLIPIDEMIA, UNSPECIFIED: ICD-10-CM

## 2024-04-24 DIAGNOSIS — Z23 ENCOUNTER FOR IMMUNIZATION: ICD-10-CM

## 2024-04-24 PROCEDURE — 99214 OFFICE O/P EST MOD 30 MIN: CPT | Mod: 25

## 2024-04-24 PROCEDURE — 90471 IMMUNIZATION ADMIN: CPT

## 2024-04-24 PROCEDURE — 90715 TDAP VACCINE 7 YRS/> IM: CPT

## 2024-04-24 PROCEDURE — G0463: CPT | Mod: 25

## 2024-04-24 NOTE — ASSESSMENT
[FreeTextEntry1] : No NSAID  Last cig > 6 hrs ago HTN start losartan 25mg w dinner RTO BP check CPE labs

## 2024-04-24 NOTE — HISTORY OF PRESENT ILLNESS
[FreeTextEntry1] : Return to work [de-identified] : 63 yo male smoker, IFG, noted vertigo March 11, 2024 and presented to the ED March 14. CTA head and neck were unremarkable and labs were unrevealing including troponon. His ECG showed nonspecific TW changes. He reported visual field issue and was cleared by ophthalmology. No further issues. Sent to Vestibular therapy  and advised no further PT because his sxs resolved He has a higher ASCVD Risk and declines statin therapy  Needs SHINGRIX Needs Tdap colon 2018 ASCVD risk 20.0

## 2024-04-25 LAB
ALBUMIN SERPL ELPH-MCNC: 4.6 G/DL
ALP BLD-CCNC: 110 U/L
ALT SERPL-CCNC: 20 U/L
ANION GAP SERPL CALC-SCNC: 11 MMOL/L
AST SERPL-CCNC: 17 U/L
BASOPHILS # BLD AUTO: 0.05 K/UL
BASOPHILS NFR BLD AUTO: 0.9 %
BILIRUB SERPL-MCNC: 0.4 MG/DL
BUN SERPL-MCNC: 12 MG/DL
CALCIUM SERPL-MCNC: 9.6 MG/DL
CHLORIDE SERPL-SCNC: 104 MMOL/L
CHOLEST SERPL-MCNC: 227 MG/DL
CO2 SERPL-SCNC: 25 MMOL/L
CREAT SERPL-MCNC: 1.33 MG/DL
EGFR: 60 ML/MIN/1.73M2
EOSINOPHIL # BLD AUTO: 0.09 K/UL
EOSINOPHIL NFR BLD AUTO: 1.6 %
ESTIMATED AVERAGE GLUCOSE: 131 MG/DL
GLUCOSE SERPL-MCNC: 103 MG/DL
HBA1C MFR BLD HPLC: 6.2 %
HCT VFR BLD CALC: 42.7 %
HDLC SERPL-MCNC: 48 MG/DL
HGB BLD-MCNC: 14.5 G/DL
IMM GRANULOCYTES NFR BLD AUTO: 0.3 %
LDLC SERPL CALC-MCNC: 152 MG/DL
LYMPHOCYTES # BLD AUTO: 2.75 K/UL
LYMPHOCYTES NFR BLD AUTO: 47.7 %
MAN DIFF?: NORMAL
MCHC RBC-ENTMCNC: 30.3 PG
MCHC RBC-ENTMCNC: 34 GM/DL
MCV RBC AUTO: 89.1 FL
MONOCYTES # BLD AUTO: 0.74 K/UL
MONOCYTES NFR BLD AUTO: 12.8 %
NEUTROPHILS # BLD AUTO: 2.12 K/UL
NEUTROPHILS NFR BLD AUTO: 36.7 %
NONHDLC SERPL-MCNC: 179 MG/DL
PLATELET # BLD AUTO: 212 K/UL
POTASSIUM SERPL-SCNC: 4 MMOL/L
PROT SERPL-MCNC: 7.1 G/DL
RBC # BLD: 4.79 M/UL
RBC # FLD: 13.4 %
SODIUM SERPL-SCNC: 140 MMOL/L
TRIGL SERPL-MCNC: 153 MG/DL
WBC # FLD AUTO: 5.77 K/UL

## 2024-04-29 DIAGNOSIS — E78.5 HYPERLIPIDEMIA, UNSPECIFIED: ICD-10-CM

## 2024-04-29 DIAGNOSIS — E66.9 OBESITY, UNSPECIFIED: ICD-10-CM

## 2024-04-29 DIAGNOSIS — Z23 ENCOUNTER FOR IMMUNIZATION: ICD-10-CM

## 2024-05-03 ENCOUNTER — APPOINTMENT (OUTPATIENT)
Dept: INTERNAL MEDICINE | Facility: CLINIC | Age: 64
End: 2024-05-03
Payer: COMMERCIAL

## 2024-05-03 ENCOUNTER — OUTPATIENT (OUTPATIENT)
Dept: OUTPATIENT SERVICES | Facility: HOSPITAL | Age: 64
LOS: 1 days | End: 2024-05-03
Payer: COMMERCIAL

## 2024-05-03 VITALS
WEIGHT: 261 LBS | OXYGEN SATURATION: 98 % | HEIGHT: 72 IN | BODY MASS INDEX: 35.35 KG/M2 | SYSTOLIC BLOOD PRESSURE: 158 MMHG | DIASTOLIC BLOOD PRESSURE: 98 MMHG | HEART RATE: 88 BPM

## 2024-05-03 VITALS — DIASTOLIC BLOOD PRESSURE: 94 MMHG | SYSTOLIC BLOOD PRESSURE: 160 MMHG

## 2024-05-03 DIAGNOSIS — I10 ESSENTIAL (PRIMARY) HYPERTENSION: ICD-10-CM

## 2024-05-03 PROCEDURE — 99214 OFFICE O/P EST MOD 30 MIN: CPT

## 2024-05-03 PROCEDURE — G0463: CPT

## 2024-05-03 RX ORDER — LOSARTAN POTASSIUM 25 MG/1
25 TABLET, FILM COATED ORAL
Qty: 30 | Refills: 11 | Status: COMPLETED | COMMUNITY
Start: 2024-04-24 | End: 2024-05-03

## 2024-05-03 RX ORDER — SILDENAFIL 100 MG/1
100 TABLET, FILM COATED ORAL
Qty: 30 | Refills: 1 | Status: COMPLETED | COMMUNITY
Start: 2023-05-08 | End: 2024-05-03

## 2024-05-03 RX ORDER — SILDENAFIL 50 MG/1
50 TABLET ORAL DAILY
Qty: 6 | Refills: 11 | Status: ACTIVE | COMMUNITY
Start: 2024-05-03 | End: 1900-01-01

## 2024-05-03 RX ORDER — ROSUVASTATIN CALCIUM 5 MG/1
5 TABLET, FILM COATED ORAL
Qty: 90 | Refills: 3 | Status: ACTIVE | COMMUNITY
Start: 2023-05-08 | End: 1900-01-01

## 2024-05-03 RX ORDER — FELODIPINE 5 MG/1
5 TABLET, EXTENDED RELEASE ORAL
Qty: 30 | Refills: 2 | Status: ACTIVE | COMMUNITY
Start: 2024-05-03 | End: 1900-01-01

## 2024-05-03 RX ORDER — TELMISARTAN 40 MG/1
40 TABLET ORAL
Qty: 30 | Refills: 11 | Status: ACTIVE | COMMUNITY
Start: 2024-05-03 | End: 1900-01-01

## 2024-05-03 NOTE — HISTORY OF PRESENT ILLNESS
[de-identified] : 63 yo male smoker, IFG, noted vertigo March 11, 2024 and presented to the ED March 14. CTA head and neck were unremarkable and labs were unrevealing including troponon. His ECG showed nonspecific TW changes. He reported visual field issue and was cleared by ophthalmology. No further issues. Sent to Vestibular therapy  and advised no further PT because his sxs resolved He has a higher ASCVD Risk and declines statin therapy  Needs SHINGRIX Needs Tdap colon 2018 ASCVD risk 20.0   Started small dose losartan 25mg

## 2024-05-03 NOTE — PHYSICAL EXAM
[No Respiratory Distress] : no respiratory distress  [No Accessory Muscle Use] : no accessory muscle use [Clear to Auscultation] : lungs were clear to auscultation bilaterally [Normal Rate] : normal rate  [Regular Rhythm] : with a regular rhythm [Normal S1, S2] : normal S1 and S2 [No Edema] : there was no peripheral edema

## 2024-06-28 NOTE — ED ADULT NURSE NOTE - NS ED NURSE RECORD ANOTHER VITAL SIGN
Refill Routing Note   Medication(s) are not appropriate for processing by [facility] for the following reason(s):        Required vitals abnormal    [facility] action(s):  Defer               Appointments  past 12m or future 3m with PCP    Date Provider   Last Visit   3/5/2024 Caro Hernandez MD   Next Visit   8/22/2024 Caro Hernandez MD   ED visits in past 90 days: 0        Note composed:10:57 AM 06/28/2024            Yes

## 2024-07-30 ENCOUNTER — APPOINTMENT (OUTPATIENT)
Dept: INTERNAL MEDICINE | Facility: CLINIC | Age: 64
End: 2024-07-30
Payer: COMMERCIAL

## 2024-07-30 ENCOUNTER — NON-APPOINTMENT (OUTPATIENT)
Age: 64
End: 2024-07-30

## 2024-07-30 ENCOUNTER — OUTPATIENT (OUTPATIENT)
Dept: OUTPATIENT SERVICES | Facility: HOSPITAL | Age: 64
LOS: 1 days | End: 2024-07-30
Payer: COMMERCIAL

## 2024-07-30 VITALS
WEIGHT: 268 LBS | SYSTOLIC BLOOD PRESSURE: 158 MMHG | HEART RATE: 85 BPM | DIASTOLIC BLOOD PRESSURE: 90 MMHG | BODY MASS INDEX: 36.3 KG/M2 | OXYGEN SATURATION: 98 % | HEIGHT: 72 IN

## 2024-07-30 VITALS — SYSTOLIC BLOOD PRESSURE: 150 MMHG | DIASTOLIC BLOOD PRESSURE: 88 MMHG

## 2024-07-30 VITALS — SYSTOLIC BLOOD PRESSURE: 132 MMHG | DIASTOLIC BLOOD PRESSURE: 76 MMHG

## 2024-07-30 DIAGNOSIS — F43.20 ADJUSTMENT DISORDER, UNSPECIFIED: ICD-10-CM

## 2024-07-30 DIAGNOSIS — Z91.89 OTHER SPECIFIED PERSONAL RISK FACTORS, NOT ELSEWHERE CLASSIFIED: ICD-10-CM

## 2024-07-30 DIAGNOSIS — I10 ESSENTIAL (PRIMARY) HYPERTENSION: ICD-10-CM

## 2024-07-30 DIAGNOSIS — Z87.891 PERSONAL HISTORY OF NICOTINE DEPENDENCE: ICD-10-CM

## 2024-07-30 DIAGNOSIS — E78.5 HYPERLIPIDEMIA, UNSPECIFIED: ICD-10-CM

## 2024-07-30 PROCEDURE — G0463: CPT

## 2024-07-30 PROCEDURE — 99396 PREV VISIT EST AGE 40-64: CPT

## 2024-07-30 PROCEDURE — 93010 ELECTROCARDIOGRAM REPORT: CPT

## 2024-07-30 RX ORDER — NICOTINE 4 MG/1
4 GUM, CHEWING ORAL
Qty: 1 | Refills: 3 | Status: ACTIVE | COMMUNITY
Start: 2024-07-30 | End: 1900-01-01

## 2024-07-30 NOTE — PHYSICAL EXAM
[No Acute Distress] : no acute distress [Well-Appearing] : well-appearing [Normal Outer Ear/Nose] : the outer ears and nose were normal in appearance [Normal Oropharynx] : the oropharynx was normal [Normal TMs] : both tympanic membranes were normal [No JVD] : no jugular venous distention [No Lymphadenopathy] : no lymphadenopathy [Supple] : supple [No Respiratory Distress] : no respiratory distress  [No Accessory Muscle Use] : no accessory muscle use [Normal Rate] : normal rate  [Regular Rhythm] : with a regular rhythm [No Edema] : there was no peripheral edema [Non Tender] : non-tender [No HSM] : no HSM [Normal Anterior Cervical Nodes] : no anterior cervical lymphadenopathy [No CVA Tenderness] : no CVA  tenderness [No Spinal Tenderness] : no spinal tenderness [Coordination Grossly Intact] : coordination grossly intact

## 2024-07-30 NOTE — HISTORY OF PRESENT ILLNESS
[FreeTextEntry1] : Stress w girlfriend Lack of Sleep 3 1/2 hrs  Intolerance to telmisartan (diarrhea, ED, weak urine) [de-identified] : 63 yo IFG, smoker, high ASCVD risk, HTN  on no meds 160/94 refusing statin for ASCVD Risk  Not taking telmisartan DIARRHEA 3%, ED, and a WEAK URINE. Concerned about growing older Last took 3 weeks ago  July 7 was the last day taken  Stress with his girlfriend  Energy is LOW Sleeping watching TV 9 to 10:30  awaken to 2 am  Sleep 3 1/2 Hours Fall asleep on couch during the DAY

## 2024-07-30 NOTE — HEALTH RISK ASSESSMENT
[0] : 2) Feeling down, depressed, or hopeless: Not at all (0) [PHQ-2 Negative - No further assessment needed] : PHQ-2 Negative - No further assessment needed [No falls in past year] : Patient reported no falls in the past year [[id number]] : 0 [Current] : Current

## 2024-07-30 NOTE — ASSESSMENT
[FreeTextEntry1] : Taking one medication amlodipine 5mg daily Continue off telmisartan Difficult because  less diuretic Unable to tolerate telmisartan and losartan Bring pills next visit  Sleep Specialty Sleep Hygien stop naps  Energy low because of poor sleep habits  Wants therapy dealing w girlfriend living w him   Still eating cold cereal Increases weight Increases insulin causes CARB Cravings Change to Omega-3 foods  Fatty fish mackerel, slamon wild  walnuts, pasture raised eggs, pumpkin seeds  Desmond seeds  Cannot tolerate avocado Berries and nonflavored yogurt Stop cold cereal

## 2024-07-30 NOTE — REVIEW OF SYSTEMS
[Diarrhea] : diarrhea [Impotence] : impotence [Abdominal Pain] : no abdominal pain [Dysuria] : no dysuria [Insomnia] : no insomnia [Anxiety] : no anxiety [Depression] : no depression [Easy Bleeding] : no easy bleeding [FreeTextEntry7] : with telmisartab [FreeTextEntry8] : w telmisartan

## 2024-08-01 LAB
ANION GAP SERPL CALC-SCNC: 11 MMOL/L
BUN SERPL-MCNC: 12 MG/DL
CALCIUM SERPL-MCNC: 10 MG/DL
CHLORIDE SERPL-SCNC: 106 MMOL/L
CHOLEST SERPL-MCNC: 233 MG/DL
CO2 SERPL-SCNC: 25 MMOL/L
CREAT SERPL-MCNC: 1.27 MG/DL
CREAT SPEC-SCNC: 166 MG/DL
CREAT SPEC-SCNC: 166 MG/DL
CREAT/PROT UR: 0.1 RATIO
EGFR: 63 ML/MIN/1.73M2
ESTIMATED AVERAGE GLUCOSE: 134 MG/DL
GLUCOSE SERPL-MCNC: 91 MG/DL
HBA1C MFR BLD HPLC: 6.3 %
HDLC SERPL-MCNC: 43 MG/DL
LDLC SERPL CALC-MCNC: 158 MG/DL
MICROALBUMIN 24H UR DL<=1MG/L-MCNC: 3.6 MG/DL
MICROALBUMIN/CREAT 24H UR-RTO: 21 MG/G
NONHDLC SERPL-MCNC: 190 MG/DL
POTASSIUM SERPL-SCNC: 4.2 MMOL/L
PROT UR-MCNC: 13 MG/DL
SODIUM SERPL-SCNC: 142 MMOL/L
TRIGL SERPL-MCNC: 172 MG/DL

## 2024-08-06 DIAGNOSIS — Z87.891 PERSONAL HISTORY OF NICOTINE DEPENDENCE: ICD-10-CM

## 2024-08-06 DIAGNOSIS — E78.5 HYPERLIPIDEMIA, UNSPECIFIED: ICD-10-CM

## 2024-08-06 DIAGNOSIS — F43.20 ADJUSTMENT DISORDER, UNSPECIFIED: ICD-10-CM

## 2024-08-06 DIAGNOSIS — Z91.89 OTHER SPECIFIED PERSONAL RISK FACTORS, NOT ELSEWHERE CLASSIFIED: ICD-10-CM

## 2024-08-06 DIAGNOSIS — Z00.00 ENCOUNTER FOR GENERAL ADULT MEDICAL EXAMINATION WITHOUT ABNORMAL FINDINGS: ICD-10-CM

## 2024-08-07 ENCOUNTER — APPOINTMENT (OUTPATIENT)
Dept: INTERNAL MEDICINE | Facility: CLINIC | Age: 64
End: 2024-08-07

## 2024-10-01 ENCOUNTER — NON-APPOINTMENT (OUTPATIENT)
Age: 64
End: 2024-10-01

## 2024-10-01 ENCOUNTER — OUTPATIENT (OUTPATIENT)
Dept: OUTPATIENT SERVICES | Facility: HOSPITAL | Age: 64
LOS: 1 days | End: 2024-10-01
Payer: COMMERCIAL

## 2024-10-01 ENCOUNTER — RX RENEWAL (OUTPATIENT)
Age: 64
End: 2024-10-01

## 2024-10-01 ENCOUNTER — APPOINTMENT (OUTPATIENT)
Dept: INTERNAL MEDICINE | Facility: CLINIC | Age: 64
End: 2024-10-01
Payer: COMMERCIAL

## 2024-10-01 VITALS
WEIGHT: 268 LBS | DIASTOLIC BLOOD PRESSURE: 98 MMHG | OXYGEN SATURATION: 98 % | BODY MASS INDEX: 36.3 KG/M2 | SYSTOLIC BLOOD PRESSURE: 138 MMHG | HEART RATE: 78 BPM | HEIGHT: 72 IN

## 2024-10-01 VITALS — SYSTOLIC BLOOD PRESSURE: 132 MMHG | DIASTOLIC BLOOD PRESSURE: 94 MMHG

## 2024-10-01 DIAGNOSIS — I10 ESSENTIAL (PRIMARY) HYPERTENSION: ICD-10-CM

## 2024-10-01 PROCEDURE — G0463: CPT

## 2024-10-01 PROCEDURE — 99214 OFFICE O/P EST MOD 30 MIN: CPT

## 2024-10-01 RX ORDER — LORATADINE 10 MG
17 TABLET,DISINTEGRATING ORAL DAILY
Qty: 1 | Refills: 6 | Status: ACTIVE | COMMUNITY
Start: 2024-10-01 | End: 1900-01-01

## 2024-10-01 RX ORDER — LOSARTAN POTASSIUM 25 MG/1
25 TABLET, FILM COATED ORAL
Qty: 90 | Refills: 0 | Status: ACTIVE | COMMUNITY
Start: 2024-10-01 | End: 1900-01-01

## 2024-12-03 ENCOUNTER — APPOINTMENT (OUTPATIENT)
Dept: INTERNAL MEDICINE | Facility: CLINIC | Age: 64
End: 2024-12-03
Payer: COMMERCIAL

## 2024-12-03 ENCOUNTER — OUTPATIENT (OUTPATIENT)
Dept: OUTPATIENT SERVICES | Facility: HOSPITAL | Age: 64
LOS: 1 days | End: 2024-12-03

## 2024-12-03 VITALS
SYSTOLIC BLOOD PRESSURE: 140 MMHG | BODY MASS INDEX: 35.67 KG/M2 | OXYGEN SATURATION: 97 % | WEIGHT: 263 LBS | HEART RATE: 84 BPM | DIASTOLIC BLOOD PRESSURE: 92 MMHG

## 2024-12-03 VITALS — DIASTOLIC BLOOD PRESSURE: 78 MMHG | SYSTOLIC BLOOD PRESSURE: 138 MMHG

## 2024-12-03 VITALS — DIASTOLIC BLOOD PRESSURE: 80 MMHG | SYSTOLIC BLOOD PRESSURE: 146 MMHG

## 2024-12-03 DIAGNOSIS — Z12.5 ENCOUNTER FOR SCREENING FOR MALIGNANT NEOPLASM OF PROSTATE: ICD-10-CM

## 2024-12-03 DIAGNOSIS — F17.213 NICOTINE DEPENDENCE, CIGARETTES, WITH WITHDRAWAL: ICD-10-CM

## 2024-12-03 DIAGNOSIS — E29.1 TESTICULAR HYPOFUNCTION: ICD-10-CM

## 2024-12-03 DIAGNOSIS — Z87.891 PERSONAL HISTORY OF NICOTINE DEPENDENCE: ICD-10-CM

## 2024-12-03 DIAGNOSIS — F17.210 NICOTINE DEPENDENCE, CIGARETTES, UNCOMPLICATED: ICD-10-CM

## 2024-12-03 DIAGNOSIS — Z91.89 OTHER SPECIFIED PERSONAL RISK FACTORS, NOT ELSEWHERE CLASSIFIED: ICD-10-CM

## 2024-12-03 DIAGNOSIS — R73.03 PREDIABETES.: ICD-10-CM

## 2024-12-03 PROCEDURE — 99214 OFFICE O/P EST MOD 30 MIN: CPT

## 2024-12-03 PROCEDURE — G0463: CPT

## 2024-12-03 RX ORDER — NICOTINE 21 MG/24HR
21 PATCH, TRANSDERMAL 24 HOURS TRANSDERMAL DAILY
Qty: 1 | Refills: 11 | Status: ACTIVE | COMMUNITY
Start: 2024-12-03 | End: 1900-01-01

## 2025-01-17 DIAGNOSIS — I10 ESSENTIAL (PRIMARY) HYPERTENSION: ICD-10-CM

## 2025-09-15 ENCOUNTER — APPOINTMENT (OUTPATIENT)
Dept: INTERNAL MEDICINE | Facility: CLINIC | Age: 65
End: 2025-09-15
Payer: COMMERCIAL

## 2025-09-15 VITALS
HEIGHT: 72 IN | DIASTOLIC BLOOD PRESSURE: 80 MMHG | BODY MASS INDEX: 35.62 KG/M2 | SYSTOLIC BLOOD PRESSURE: 140 MMHG | WEIGHT: 263 LBS | OXYGEN SATURATION: 98 % | HEART RATE: 87 BPM

## 2025-09-15 DIAGNOSIS — F17.213 NICOTINE DEPENDENCE, CIGARETTES, WITH WITHDRAWAL: ICD-10-CM

## 2025-09-15 DIAGNOSIS — Z91.89 OTHER SPECIFIED PERSONAL RISK FACTORS, NOT ELSEWHERE CLASSIFIED: ICD-10-CM

## 2025-09-15 DIAGNOSIS — E78.5 HYPERLIPIDEMIA, UNSPECIFIED: ICD-10-CM

## 2025-09-15 DIAGNOSIS — I10 ESSENTIAL (PRIMARY) HYPERTENSION: ICD-10-CM

## 2025-09-15 DIAGNOSIS — Z00.00 ENCOUNTER FOR GENERAL ADULT MEDICAL EXAMINATION W/OUT ABNORMAL FINDINGS: ICD-10-CM

## 2025-09-15 DIAGNOSIS — Z86.79 PERSONAL HISTORY OF OTHER DISEASES OF THE CIRCULATORY SYSTEM: ICD-10-CM

## 2025-09-15 DIAGNOSIS — H61.20 IMPACTED CERUMEN, UNSPECIFIED EAR: ICD-10-CM

## 2025-09-15 DIAGNOSIS — R06.09 OTHER FORMS OF DYSPNEA: ICD-10-CM

## 2025-09-15 PROCEDURE — 99396 PREV VISIT EST AGE 40-64: CPT

## 2025-09-15 RX ORDER — ATORVASTATIN CALCIUM 10 MG/1
10 TABLET, FILM COATED ORAL
Qty: 30 | Refills: 11 | Status: ACTIVE | COMMUNITY
Start: 2025-09-15 | End: 1900-01-01

## 2025-09-16 LAB
ALBUMIN SERPL ELPH-MCNC: 4.4 G/DL
ALP BLD-CCNC: 108 U/L
ALT SERPL-CCNC: 25 U/L
ANION GAP SERPL CALC-SCNC: 15 MMOL/L
AST SERPL-CCNC: 19 U/L
BASOPHILS # BLD AUTO: 0.06 K/UL
BASOPHILS NFR BLD AUTO: 1 %
BILIRUB SERPL-MCNC: 0.4 MG/DL
BUN SERPL-MCNC: 14 MG/DL
CALCIUM SERPL-MCNC: 9.8 MG/DL
CHLORIDE SERPL-SCNC: 107 MMOL/L
CHOLEST SERPL-MCNC: 263 MG/DL
CO2 SERPL-SCNC: 22 MMOL/L
CREAT SERPL-MCNC: 1.25 MG/DL
EGFRCR SERPLBLD CKD-EPI 2021: 64 ML/MIN/1.73M2
EOSINOPHIL # BLD AUTO: 0.11 K/UL
EOSINOPHIL NFR BLD AUTO: 1.8 %
ESTIMATED AVERAGE GLUCOSE: 128 MG/DL
GLUCOSE SERPL-MCNC: 100 MG/DL
HBA1C MFR BLD HPLC: 6.1 %
HCT VFR BLD CALC: 45.9 %
HDLC SERPL-MCNC: 45 MG/DL
HGB BLD-MCNC: 15.3 G/DL
IMM GRANULOCYTES NFR BLD AUTO: 0.3 %
LDLC SERPL-MCNC: 160 MG/DL
LYMPHOCYTES # BLD AUTO: 2.57 K/UL
LYMPHOCYTES NFR BLD AUTO: 42 %
MAN DIFF?: NORMAL
MCHC RBC-ENTMCNC: 30.5 PG
MCHC RBC-ENTMCNC: 33.3 G/DL
MCV RBC AUTO: 91.6 FL
MONOCYTES # BLD AUTO: 0.8 K/UL
MONOCYTES NFR BLD AUTO: 13.1 %
NEUTROPHILS # BLD AUTO: 2.56 K/UL
NEUTROPHILS NFR BLD AUTO: 41.8 %
NONHDLC SERPL-MCNC: 218 MG/DL
PLATELET # BLD AUTO: 212 K/UL
POTASSIUM SERPL-SCNC: 4.3 MMOL/L
PROT SERPL-MCNC: 7.3 G/DL
RBC # BLD: 5.01 M/UL
RBC # FLD: 14.3 %
SODIUM SERPL-SCNC: 143 MMOL/L
TRIGL SERPL-MCNC: 309 MG/DL
VIT B12 SERPL-MCNC: 437 PG/ML
WBC # FLD AUTO: 6.12 K/UL